# Patient Record
Sex: FEMALE | Race: WHITE | NOT HISPANIC OR LATINO | Employment: FULL TIME | URBAN - METROPOLITAN AREA
[De-identification: names, ages, dates, MRNs, and addresses within clinical notes are randomized per-mention and may not be internally consistent; named-entity substitution may affect disease eponyms.]

---

## 2023-03-16 DIAGNOSIS — J30.9 ALLERGIC RHINITIS, UNSPECIFIED: ICD-10-CM

## 2023-03-20 RX ORDER — AZELASTINE 1 MG/ML
SPRAY, METERED NASAL
Qty: 1 ML | Refills: 0 | Status: SHIPPED | OUTPATIENT
Start: 2023-03-20 | End: 2023-12-12 | Stop reason: WASHOUT

## 2023-03-20 RX ORDER — FLUTICASONE PROPIONATE 50 MCG
SPRAY, SUSPENSION (ML) NASAL
Qty: 16 ML | Refills: 0 | Status: SHIPPED | OUTPATIENT
Start: 2023-03-20 | End: 2023-12-12 | Stop reason: WASHOUT

## 2023-06-21 ENCOUNTER — TELEPHONE (OUTPATIENT)
Dept: PRIMARY CARE | Facility: CLINIC | Age: 63
End: 2023-06-21
Payer: COMMERCIAL

## 2023-06-21 ENCOUNTER — LAB (OUTPATIENT)
Dept: LAB | Facility: LAB | Age: 63
End: 2023-06-21
Payer: COMMERCIAL

## 2023-06-21 DIAGNOSIS — R39.9 URINARY TRACT INFECTION SYMPTOMS: ICD-10-CM

## 2023-06-21 DIAGNOSIS — N39.0 ACUTE UTI: Primary | ICD-10-CM

## 2023-06-21 DIAGNOSIS — R39.9 URINARY TRACT INFECTION SYMPTOMS: Primary | ICD-10-CM

## 2023-06-21 PROCEDURE — 87086 URINE CULTURE/COLONY COUNT: CPT

## 2023-06-21 PROCEDURE — 87077 CULTURE AEROBIC IDENTIFY: CPT

## 2023-06-21 PROCEDURE — 87186 SC STD MICRODIL/AGAR DIL: CPT

## 2023-06-23 RX ORDER — NITROFURANTOIN 25; 75 MG/1; MG/1
100 CAPSULE ORAL 2 TIMES DAILY
Qty: 10 CAPSULE | Refills: 0 | Status: SHIPPED | OUTPATIENT
Start: 2023-06-23 | End: 2023-06-28

## 2023-06-24 LAB — URINE CULTURE: ABNORMAL

## 2023-06-26 RX ORDER — TRIMETHOPRIM 100 MG/1
100 TABLET ORAL 2 TIMES DAILY
Qty: 10 TABLET | Refills: 0 | Status: SHIPPED | OUTPATIENT
Start: 2023-06-26 | End: 2023-07-01

## 2023-07-31 ENCOUNTER — TELEPHONE (OUTPATIENT)
Dept: PRIMARY CARE | Facility: CLINIC | Age: 63
End: 2023-07-31
Payer: COMMERCIAL

## 2023-07-31 DIAGNOSIS — G89.29 CHRONIC NECK PAIN: Primary | ICD-10-CM

## 2023-07-31 DIAGNOSIS — M54.2 CHRONIC NECK PAIN: Primary | ICD-10-CM

## 2023-07-31 PROBLEM — N80.9 ENDOMETRIOSIS: Status: ACTIVE | Noted: 2023-07-31

## 2023-07-31 PROBLEM — N95.2 ATROPHIC VAGINITIS: Status: ACTIVE | Noted: 2023-07-31

## 2023-07-31 PROBLEM — D17.9 LIPOMA: Status: ACTIVE | Noted: 2023-07-31

## 2023-10-02 ENCOUNTER — TREATMENT (OUTPATIENT)
Dept: PHYSICAL THERAPY | Facility: CLINIC | Age: 63
End: 2023-10-02
Payer: COMMERCIAL

## 2023-10-02 DIAGNOSIS — M54.2 CHRONIC NECK PAIN: Primary | ICD-10-CM

## 2023-10-02 DIAGNOSIS — G89.29 CHRONIC NECK PAIN: Primary | ICD-10-CM

## 2023-10-02 PROCEDURE — 97110 THERAPEUTIC EXERCISES: CPT | Mod: GP | Performed by: PHYSICAL THERAPIST

## 2023-10-02 PROCEDURE — 97140 MANUAL THERAPY 1/> REGIONS: CPT | Mod: GP | Performed by: PHYSICAL THERAPIST

## 2023-10-02 ASSESSMENT — PAIN - FUNCTIONAL ASSESSMENT: PAIN_FUNCTIONAL_ASSESSMENT: 0-10

## 2023-10-02 ASSESSMENT — PAIN SCALES - GENERAL: PAINLEVEL_OUTOF10: 1

## 2023-10-02 NOTE — PROGRESS NOTES
Physical Therapy  Physical Therapy Treatment    Patient Name: Luh Degroot  MRN: [id number]  Today's Date: 10/2/2023  Time Calculation  Start Time: 1655  Stop Time: 1735  Time Calculation (min): 40 min    Visit Count: 5  Auth: No  Insurance:  Employee Medical Plan      Current Problem  1. Chronic neck pain            General  Reason for Referral: neck pain  Referred By: Oh    Pain  Pain Assessment: 0-10  Pain Score: 1  Pain Location: Neck    Subjective:   Subjective   Patient reports Pt reports that her neck is catching more when doing the exercises.     Objective:   R Cervical Rotation: 60%      Treatments:   Therapeutic Exercise  Therapeutic Exercise Activity 1: Cervical Snag ext x20  Therapeutic Exercise Activity 2: Cervical snag rotation x20 each  Therapeutic Exercise Activity 3: standing ER 3x10 B 2.5#  Therapeutic Exercise Activity 4: Standing scaption 3x10 2#  Therapeutic Exercise Activity 5: Wall circles 2x10 clockwise/counter B  Therapeutic Exercise Activity 6: Prone row 2x10 B 5#    Manual Therapy  Manual Therapy Activity 1: Dry Needling 3x30 mm R UT/erector spinae x10 min      Assessment:      Dry needling was initiated today with fair tolerance, pt demonstrated a twitch response and a deep aching sensation in the R upper trap and erector spinae. Pt demonstrated improvement in her R cervical rotation afterwards and no catching sensation that she was reporting prior to the appointment. The remaining session was focused on shoulder strengthening with fair tolerance. The pt did demonstrate increased muscular fatigue with the noted exercises. The pt is still though limited in her cervical ROM and pain at this time, along with her shoulder and scapular strength/endurance.     Education:     Access Code: [email or web address]  URL: [email or web address]  Date: 10/02/2023  Prepared by: Richardson Manning    Exercises  - Seated Upper Trapezius Stretch  - 2 x daily - 7 x weekly - 2 reps - 30 sec  hold  - Seated Levator Scapulae Stretch  - 2 x daily - 7 x weekly - 2 reps - 30 sec hold  - Cervical Extension AROM with Strap  - 2 x daily - 7 x weekly - 3 sets - 10 reps  - Seated Assisted Cervical Rotation with Towel  - 2 x daily - 7 x weekly - 2 sets - 15 reps  - Supine Cervical Retraction with Towel  - 2 x daily - 7 x weekly - 3 sets - 10 reps  - Sidelying Open Book Thoracic Lumbar Rotation and Extension  - 2 x daily - 7 x weekly - 2 sets - 10 reps  - Prone Shoulder Horizontal Abduction  - 2 x daily - 7 x weekly - 2 sets - 6 reps - 6 sec hold  - Scaption with Dumbbells  - 2 x daily - 7 x weekly - 3 sets - 10 reps    Plan: Continue with neck ROM, assesses response to Dry needling.        Goals:  Encounter Problems       Encounter Problems (Active)       PT Problem       PT Goal 1       Start:  10/02/23    Expected End:  10/06/23       Short Term  1. Pt will demonstrated improvements in shoulder/scapular strength, specifically to 4+/5 in 4 weeks, in order to progress back to PLOF.    2. Pt will demonstrate improvements in cervical AROM, specifically rotation 50% in 4 weeks, in order to improve functional level.     3. Pt will demonstrate the ability to complete half a work day (4 hours) with minimal pain (2-3/10 pain) in 4 weeks, in order to return to pain free prior level.     4. Pt will demonstrate improved standing posture and proper shoulder and scapular control with overhead activities in 4 weeks, in order to decrease stress and strain with activities.    5. Pt will demonstrate Ind ability with HEP.            PT Goal 2       Start:  10/02/23    Expected End:  11/06/23       Long Term Goals  1. Pt will demonstrated improvements and symmetry in shoulder/scapular strength to 5/5 in 8 weeks, in order to return to PLOF.    2. Pt will demonstrate full cervical and shoulder ROM, specifically in 8 weeks, in order to return to prior overhead function.    3. Pt will demonstrate the ability to complete all functional  activities with no pain in 8 weeks.     4. Pt will demonstrate the ability to complete a full work day (8 hours) with no pain (0-1/10 pain) in 8 weeks, in order to return to pain free prior level.     5. decrease NDI by 6 points, in 8 weeks, in order to return to PLOF.                     Richardson Manning, PT

## 2023-10-11 PROBLEM — L82.1 OTHER SEBORRHEIC KERATOSIS: Status: ACTIVE | Noted: 2022-12-06

## 2023-10-11 PROBLEM — K62.5 RECTAL BLEEDING: Status: ACTIVE | Noted: 2023-10-11

## 2023-10-11 PROBLEM — R43.8 METALLIC TASTE: Status: ACTIVE | Noted: 2023-10-11

## 2023-10-11 PROBLEM — M79.641 PAIN OF RIGHT HAND: Status: ACTIVE | Noted: 2023-10-11

## 2023-10-11 PROBLEM — S92.911A FRACTURE OF TOE OF RIGHT FOOT: Status: ACTIVE | Noted: 2023-10-11

## 2023-10-11 PROBLEM — D48.5 NEOPLASM OF UNCERTAIN BEHAVIOR OF SKIN: Status: ACTIVE | Noted: 2022-12-06

## 2023-10-11 PROBLEM — L57.9 SKIN CHANGES DUE TO CHRONIC EXPOSURE TO NONIONIZING RADIATION: Status: ACTIVE | Noted: 2022-12-06

## 2023-10-11 PROBLEM — R05.9 COUGH: Status: ACTIVE | Noted: 2023-10-11

## 2023-10-11 PROBLEM — H52.4 BILATERAL PRESBYOPIA: Status: ACTIVE | Noted: 2023-10-11

## 2023-10-11 PROBLEM — N89.8 VAGINAL ITCHING: Status: ACTIVE | Noted: 2023-10-11

## 2023-10-11 PROBLEM — H52.203 ASTIGMATISM, BILATERAL: Status: ACTIVE | Noted: 2023-10-11

## 2023-10-11 PROBLEM — L81.4 OTHER MELANIN HYPERPIGMENTATION: Status: ACTIVE | Noted: 2022-12-06

## 2023-10-11 PROBLEM — N76.0 VAGINITIS: Status: ACTIVE | Noted: 2023-10-11

## 2023-10-11 PROBLEM — R07.89 ATYPICAL CHEST PAIN: Status: ACTIVE | Noted: 2023-10-11

## 2023-10-11 PROBLEM — K58.0 IRRITABLE BOWEL SYNDROME WITH DIARRHEA: Status: ACTIVE | Noted: 2023-10-11

## 2023-10-11 PROBLEM — R11.0 MODERATE NAUSEA: Status: ACTIVE | Noted: 2023-10-11

## 2023-10-11 PROBLEM — L57.0 ACTINIC KERATOSIS: Status: ACTIVE | Noted: 2022-12-06

## 2023-10-11 PROBLEM — J30.9 ALLERGIC RHINITIS: Status: ACTIVE | Noted: 2023-10-11

## 2023-10-11 PROBLEM — K62.5 BRIGHT RED BLOOD PER RECTUM: Status: ACTIVE | Noted: 2023-10-11

## 2023-10-11 PROBLEM — D22.60 MELANOCYTIC NEVI OF UNSPECIFIED UPPER LIMB, INCLUDING SHOULDER: Status: ACTIVE | Noted: 2022-12-06

## 2023-10-11 PROBLEM — M77.12 LATERAL EPICONDYLITIS OF LEFT ELBOW: Status: ACTIVE | Noted: 2023-10-11

## 2023-10-11 PROBLEM — D18.01 HEMANGIOMA OF SKIN AND SUBCUTANEOUS TISSUE: Status: ACTIVE | Noted: 2022-12-06

## 2023-10-11 PROBLEM — N76.2 VULVITIS: Status: ACTIVE | Noted: 2023-10-11

## 2023-10-11 PROBLEM — E55.9 VITAMIN D DEFICIENCY: Status: ACTIVE | Noted: 2023-10-11

## 2023-10-11 PROBLEM — B07.8 OTHER VIRAL WARTS: Status: ACTIVE | Noted: 2022-12-06

## 2023-10-11 PROBLEM — M75.21 BICIPITAL TENDINITIS OF RIGHT SHOULDER: Status: ACTIVE | Noted: 2023-10-11

## 2023-10-11 PROBLEM — D22.5 MELANOCYTIC NEVI OF TRUNK: Status: ACTIVE | Noted: 2022-12-06

## 2023-10-11 PROBLEM — D22.72 MELANOCYTIC NEVI OF LEFT LOWER LIMB, INCLUDING HIP: Status: ACTIVE | Noted: 2022-12-06

## 2023-10-11 RX ORDER — ACETAMINOPHEN 500 MG
50 TABLET ORAL DAILY
COMMUNITY
Start: 2020-03-24

## 2023-10-11 RX ORDER — IBUPROFEN 600 MG/1
600 TABLET ORAL 4 TIMES DAILY
COMMUNITY
Start: 2013-06-11 | End: 2023-12-12 | Stop reason: WASHOUT

## 2023-10-11 RX ORDER — CIPROFLOXACIN 500 MG/1
500 TABLET ORAL 2 TIMES DAILY
COMMUNITY
Start: 2010-12-30 | End: 2023-12-12 | Stop reason: WASHOUT

## 2023-10-11 RX ORDER — SOD SULF/POT CHLORIDE/MAG SULF 1.479 G
TABLET ORAL
COMMUNITY
End: 2023-12-12 | Stop reason: WASHOUT

## 2023-10-11 RX ORDER — DESIPRAMINE HYDROCHLORIDE 25 MG/1
TABLET ORAL
COMMUNITY
Start: 2018-06-02 | End: 2023-12-12 | Stop reason: WASHOUT

## 2023-10-11 RX ORDER — PETROLATUM,WHITE/LANOLIN
OINTMENT (GRAM) TOPICAL
COMMUNITY
Start: 2023-02-06

## 2023-10-12 ENCOUNTER — TREATMENT (OUTPATIENT)
Dept: PHYSICAL THERAPY | Facility: CLINIC | Age: 63
End: 2023-10-12
Payer: COMMERCIAL

## 2023-10-12 DIAGNOSIS — G89.29 CHRONIC NECK PAIN: Primary | ICD-10-CM

## 2023-10-12 DIAGNOSIS — M54.2 CHRONIC NECK PAIN: Primary | ICD-10-CM

## 2023-10-12 PROCEDURE — 97110 THERAPEUTIC EXERCISES: CPT | Mod: GP | Performed by: PHYSICAL THERAPIST

## 2023-10-12 PROCEDURE — 97140 MANUAL THERAPY 1/> REGIONS: CPT | Mod: GP | Performed by: PHYSICAL THERAPIST

## 2023-10-12 ASSESSMENT — PAIN SCALES - GENERAL: PAINLEVEL_OUTOF10: 6

## 2023-10-12 ASSESSMENT — PAIN - FUNCTIONAL ASSESSMENT: PAIN_FUNCTIONAL_ASSESSMENT: 0-10

## 2023-10-12 NOTE — PROGRESS NOTES
Physical Therapy  Physical Therapy Treatment    Patient Name: Luh Degroot  MRN: [id number]  Today's Date: 10/12/2023  Time Calculation  Start Time: 0750  Stop Time: 0830  Time Calculation (min): 40 min    Visit Count: 5  Auth: No  Insurance:  Employee Medical Plan  Current Problem    1. Chronic neck pain            General  Reason for Referral: neck pain  Referred By: Oh    Pain  Pain Assessment: 0-10  Pain Score: 6  Pain Location: Neck    Subjective:   Patient reports that she felt great after the needling for one day, but then her pain     HEP Compliance: Good    Objective:   R cervical rotation: 25%    Treatments:   Therapeutic Exercise  Therapeutic Exercise Activity 1: Chin tucks with hot pack x50 reps  Therapeutic Exercise Activity 2: pec stretch x2 min  Therapeutic Exercise Activity 3: wall angels 2x10    Manual Therapy  Manual Therapy Activity 1: STM to suboccipitals x5 min  Manual Therapy Activity 2: PA gliding to C2-C5 Grade III x5 min  Manual Therapy Activity 3: Dry needling 4x30 mm to R erector spinae x15 min      Assessment: Pt demonstrated significant limitations in her pain and neck ROM today. The pt demonstrated increased upper cervical tightness and muscle spasm. Soft tissue, dry needling, and mobilizations were performed today with some improvements, but not greatly improved. Pec stretching was initiated today with good tolerance. The pt is still greatly limited in her overall pain and mobility, where she continues to be a good candidate for skilled PT.        Education: Educated on decrease stretching at home. Will initiate use of heat pad before and during exercises.   Access Code: [email or web address]  URL: [email or web address]  Date: 10/12/2023  Prepared by: Richardson Manning    Exercises  - Seated Upper Trapezius Stretch  - 2 x daily - 7 x weekly - 2 reps - 30 sec hold  - Seated Levator Scapulae Stretch  - 2 x daily - 7 x weekly - 2 reps - 30 sec hold  - Cervical Extension AROM  with Strap  - 2 x daily - 7 x weekly - 3 sets - 10 reps  - Seated Assisted Cervical Rotation with Towel  - 2 x daily - 7 x weekly - 2 sets - 15 reps  - Supine Cervical Retraction with Towel  - 2 x daily - 7 x weekly - 3 sets - 10 reps  - Sidelying Open Book Thoracic Lumbar Rotation and Extension  - 2 x daily - 7 x weekly - 2 sets - 10 reps    Plan: Re-assess response to needling and overall motion and pain.        Goals:  Active       PT Problem       PT Goal 1       Start:  10/02/23    Expected End:  10/06/23       Short Term  1. Pt will demonstrated improvements in shoulder/scapular strength, specifically to 4+/5 in 4 weeks, in order to progress back to PLOF.    2. Pt will demonstrate improvements in cervical AROM, specifically rotation 50% in 4 weeks, in order to improve functional level.     3. Pt will demonstrate the ability to complete half a work day (4 hours) with minimal pain (2-3/10 pain) in 4 weeks, in order to return to pain free prior level.     4. Pt will demonstrate improved standing posture and proper shoulder and scapular control with overhead activities in 4 weeks, in order to decrease stress and strain with activities.    5. Pt will demonstrate Ind ability with HEP.            PT Goal 2       Start:  10/02/23    Expected End:  11/06/23       Long Term Goals  1. Pt will demonstrated improvements and symmetry in shoulder/scapular strength to 5/5 in 8 weeks, in order to return to PLOF.    2. Pt will demonstrate full cervical and shoulder ROM, specifically in 8 weeks, in order to return to prior overhead function.    3. Pt will demonstrate the ability to complete all functional activities with no pain in 8 weeks.     4. Pt will demonstrate the ability to complete a full work day (8 hours) with no pain (0-1/10 pain) in 8 weeks, in order to return to pain free prior level.     5. decrease NDI by 6 points, in 8 weeks, in order to return to PLOF.                  Richardson Manning, PT, SCS, CSCS

## 2023-10-17 ENCOUNTER — TREATMENT (OUTPATIENT)
Dept: PHYSICAL THERAPY | Facility: CLINIC | Age: 63
End: 2023-10-17
Payer: COMMERCIAL

## 2023-10-17 DIAGNOSIS — G89.29 CHRONIC NECK PAIN: Primary | ICD-10-CM

## 2023-10-17 DIAGNOSIS — M54.2 CHRONIC NECK PAIN: Primary | ICD-10-CM

## 2023-10-17 PROCEDURE — 97140 MANUAL THERAPY 1/> REGIONS: CPT | Mod: GP | Performed by: PHYSICAL THERAPIST

## 2023-10-17 PROCEDURE — 97110 THERAPEUTIC EXERCISES: CPT | Mod: GP | Performed by: PHYSICAL THERAPIST

## 2023-10-17 ASSESSMENT — PAIN SCALES - GENERAL: PAINLEVEL_OUTOF10: 4

## 2023-10-17 ASSESSMENT — PAIN - FUNCTIONAL ASSESSMENT: PAIN_FUNCTIONAL_ASSESSMENT: 0-10

## 2023-10-17 NOTE — PROGRESS NOTES
Physical Therapy  Physical Therapy Treatment    Patient Name: Luh Degroot  MRN: [id number]  Today's Date: 10/17/2023  Time Calculation  Start Time: 0745  Stop Time: 0830  Time Calculation (min): 45 min    Visit Count: 6  Auth: No  Insurance:  Employee Medical Plan  Current Problem    Current Problem  1. Chronic neck pain            General  Reason for Referral: neck pain  Referred By: Oh    Pain  Pain Assessment: 0-10  Pain Score: 4  Pain Location: Neck    Subjective:   Patient reports that her neck feels slightly better today.     HEP Compliance: Good.     Objective:   R cervical rotation 50%    Treatments:   Therapeutic Exercise  Therapeutic Exercise Activity 1: hot pack x3 min  Therapeutic Exercise Activity 2: Chin tucks with hot pack x50 reps  Therapeutic Exercise Activity 3: Thoracic extension 2x10  Therapeutic Exercise Activity 4: pec stretching x2 min  Therapeutic Exercise Activity 5: strug 5# 3x10  Therapeutic Exercise Activity 6: suit case carry x3 laps    Manual Therapy  Manual Therapy Activity 1: STM to the R erector spinae x15 min  Manual Therapy Activity 2: PA gliding to C2-C5 x5 min  Manual Therapy Activity 3: Lateral glide C3-C5 x5 min      Assessment: The focus of the session was ROM, Stretching, and soft tissue massage. The pt demonstrated Fair tolerance to the noted exercises today. The pt is demonstrated Fair progress in skilled rehab at this time. The pt is though still greatly limited in her R erector spinae being reflexively contracted, limiting her R cervical rotation. The pt is though still limited by her he pt is still limited in overall ROM, Flexibility, and Pain at this time. The pt continues to be a good candidate for skilled PT, in order to further improve ROM, Flexibility, and Pain.          Education: Access Code: [email or web address]  URL: [email or web address]  Date: 10/17/2023  Prepared by: Richardson Manning    Exercises  - Seated Upper Trapezius Stretch  - 2 x daily  - 7 x weekly - 2 reps - 30 sec hold  - Seated Levator Scapulae Stretch  - 2 x daily - 7 x weekly - 2 reps - 30 sec hold  - Cervical Extension AROM with Strap  - 2 x daily - 7 x weekly - 3 sets - 10 reps  - Seated Assisted Cervical Rotation with Towel  - 2 x daily - 7 x weekly - 2 sets - 15 reps  - Supine Cervical Retraction with Towel  - 2 x daily - 7 x weekly - 3 sets - 10 reps  - Sidelying Open Book Thoracic Lumbar Rotation and Extension  - 2 x daily - 7 x weekly - 2 sets - 10 reps  - Kettlebell Suitcase Carry  - 1 x daily - 7 x weekly - 3 sets    Plan: Continue with cervical ROM.        Goals:  Active       PT Problem       PT Goal 1       Start:  10/02/23    Expected End:  10/06/23       Short Term  1. Pt will demonstrated improvements in shoulder/scapular strength, specifically to 4+/5 in 4 weeks, in order to progress back to PLOF.    2. Pt will demonstrate improvements in cervical AROM, specifically rotation 50% in 4 weeks, in order to improve functional level.     3. Pt will demonstrate the ability to complete half a work day (4 hours) with minimal pain (2-3/10 pain) in 4 weeks, in order to return to pain free prior level.     4. Pt will demonstrate improved standing posture and proper shoulder and scapular control with overhead activities in 4 weeks, in order to decrease stress and strain with activities.    5. Pt will demonstrate Ind ability with HEP.            PT Goal 2       Start:  10/02/23    Expected End:  11/06/23       Long Term Goals  1. Pt will demonstrated improvements and symmetry in shoulder/scapular strength to 5/5 in 8 weeks, in order to return to PLOF.    2. Pt will demonstrate full cervical and shoulder ROM, specifically in 8 weeks, in order to return to prior overhead function.    3. Pt will demonstrate the ability to complete all functional activities with no pain in 8 weeks.     4. Pt will demonstrate the ability to complete a full work day (8 hours) with no pain (0-1/10 pain) in 8  weeks, in order to return to pain free prior level.     5. decrease NDI by 6 points, in 8 weeks, in order to return to PLOF.                  Richardson Manning, PT, SCS, CSCS

## 2023-11-03 ENCOUNTER — TREATMENT (OUTPATIENT)
Dept: PHYSICAL THERAPY | Facility: CLINIC | Age: 63
End: 2023-11-03
Payer: COMMERCIAL

## 2023-11-03 DIAGNOSIS — G89.29 CHRONIC NECK PAIN: Primary | ICD-10-CM

## 2023-11-03 DIAGNOSIS — M54.2 CHRONIC NECK PAIN: Primary | ICD-10-CM

## 2023-11-03 PROCEDURE — 97140 MANUAL THERAPY 1/> REGIONS: CPT | Mod: GP | Performed by: PHYSICAL THERAPIST

## 2023-11-03 PROCEDURE — 97110 THERAPEUTIC EXERCISES: CPT | Mod: GP | Performed by: PHYSICAL THERAPIST

## 2023-11-03 ASSESSMENT — PAIN SCALES - GENERAL: PAINLEVEL_OUTOF10: 5 - MODERATE PAIN

## 2023-11-03 ASSESSMENT — PAIN - FUNCTIONAL ASSESSMENT: PAIN_FUNCTIONAL_ASSESSMENT: 0-10

## 2023-11-03 NOTE — PROGRESS NOTES
Physical Therapy  Physical Therapy Treatment    Patient Name: Luh Degroot  MRN: [id number]  Today's Date: 11/3/2023  Time Calculation  Start Time: 1225  Stop Time: 1305  Time Calculation (min): 40 min    Visit Count: 7  Auth: No  Insurance:  Employee Medical Plan  Current Problem    Current Problem  1. Chronic neck pain            General  Reason for Referral: neck pain  Referred By: Oh    Pain  Pain Assessment: 0-10  Pain Score: 5 - Moderate pain  Pain Location: Neck    Subjective:   Patient reports that her neck feels about the same.     HEP Compliance: Fair.     Objective:   Cervical rotation R: 30%    Treatments:   Therapeutic Exercise  Therapeutic Exercise Activity 1: suit case carry x3 laps (4 kgs)  Therapeutic Exercise Activity 2: 90/90 carry 2 kg x3 laps  Therapeutic Exercise Activity 3: Pillow case slide 3x8  Therapeutic Exercise Activity 4: Push up plus 3x10    Manual Therapy  Manual Therapy Activity 1: STM to the R erector spinae x15 min  Manual Therapy Activity 2: Lateral glide C3-C5 x5 min  Manual Therapy Activity 3: PA gliding to C2-C5 x5 min      Assessment: The focus of the session was Strengthening, ROM, joint mobilizations, and soft tissue massage. The pt demonstrated Fair tolerance to the noted exercises today. The pt is demonstrated Fair progress in skilled rehab at this time.  The pt did demonstrate improvements in her symptoms after the soft tissue today. However, the pt is though still greatly limited at this time in her pain level and overall cervical ROM, especially with R rotation. The pt is still limited in overall Strength, ROM, Flexibility, and Pain at this time. The pt continues to be a good candidate for skilled PT, in order to further improve Strength, ROM, Flexibility, and Pain.          Education: Access Code: [email or web address]  URL: [email or web address]  Date: 11/03/2023  Prepared by: Richardson Manning    Exercises  - Seated Upper Trapezius Stretch  - 2 x daily  - 7 x weekly - 2 reps - 30 sec hold  - Seated Levator Scapulae Stretch  - 2 x daily - 7 x weekly - 2 reps - 30 sec hold  - Cervical Extension AROM with Strap  - 2 x daily - 7 x weekly - 3 sets - 10 reps  - Seated Assisted Cervical Rotation with Towel  - 2 x daily - 7 x weekly - 2 sets - 15 reps  - Supine Cervical Retraction with Towel  - 2 x daily - 7 x weekly - 3 sets - 10 reps  - Sidelying Open Book Thoracic Lumbar Rotation and Extension  - 2 x daily - 7 x weekly - 2 sets - 10 reps  - Kettlebell Suitcase Carry  - 1 x daily - 7 x weekly - 3 sets  - Scapular Wall Slides  - 1 x daily - 7 x weekly - 3 sets - 8 reps    Plan:    Continue with trap stretching.     Goals:  Active       PT Problem       PT Goal 1       Start:  10/02/23    Expected End:  10/06/23       Short Term  1. Pt will demonstrated improvements in shoulder/scapular strength, specifically to 4+/5 in 4 weeks, in order to progress back to PLOF.    2. Pt will demonstrate improvements in cervical AROM, specifically rotation 50% in 4 weeks, in order to improve functional level.     3. Pt will demonstrate the ability to complete half a work day (4 hours) with minimal pain (2-3/10 pain) in 4 weeks, in order to return to pain free prior level.     4. Pt will demonstrate improved standing posture and proper shoulder and scapular control with overhead activities in 4 weeks, in order to decrease stress and strain with activities.    5. Pt will demonstrate Ind ability with HEP.            PT Goal 2       Start:  10/02/23    Expected End:  11/06/23       Long Term Goals  1. Pt will demonstrated improvements and symmetry in shoulder/scapular strength to 5/5 in 8 weeks, in order to return to PLOF.    2. Pt will demonstrate full cervical and shoulder ROM, specifically in 8 weeks, in order to return to prior overhead function.    3. Pt will demonstrate the ability to complete all functional activities with no pain in 8 weeks.     4. Pt will demonstrate the ability to  complete a full work day (8 hours) with no pain (0-1/10 pain) in 8 weeks, in order to return to pain free prior level.     5. decrease NDI by 6 points, in 8 weeks, in order to return to PLOF.                  Richardson Manning, PT, SCS, CSCS

## 2023-11-08 ENCOUNTER — TREATMENT (OUTPATIENT)
Dept: PHYSICAL THERAPY | Facility: CLINIC | Age: 63
End: 2023-11-08
Payer: COMMERCIAL

## 2023-11-08 DIAGNOSIS — M54.2 CHRONIC NECK PAIN: Primary | ICD-10-CM

## 2023-11-08 DIAGNOSIS — G89.29 CHRONIC NECK PAIN: Primary | ICD-10-CM

## 2023-11-08 PROCEDURE — 97110 THERAPEUTIC EXERCISES: CPT | Mod: GP | Performed by: PHYSICAL THERAPIST

## 2023-11-08 PROCEDURE — 97140 MANUAL THERAPY 1/> REGIONS: CPT | Mod: GP | Performed by: PHYSICAL THERAPIST

## 2023-11-08 ASSESSMENT — PAIN - FUNCTIONAL ASSESSMENT: PAIN_FUNCTIONAL_ASSESSMENT: 0-10

## 2023-11-08 ASSESSMENT — PAIN SCALES - GENERAL: PAINLEVEL_OUTOF10: 0 - NO PAIN

## 2023-11-08 NOTE — PROGRESS NOTES
Physical Therapy  Physical Therapy Treatment    Patient Name: Luh Degroot  MRN: [id number]  Today's Date: 11/8/2023  Time Calculation  Start Time: 1731  Stop Time: 1813  Time Calculation (min): 42 min    Visit Count: 8  Auth: No  Insurance:  Employee Medical Plan  Current Problem    Current Problem  1. Chronic neck pain            General  Reason for Referral: neck pain  Referred By: Oh    Pain  Pain Assessment: 0-10  Pain Score: 0 - No pain  Pain Location: Neck    Subjective:   Patient reports that she got a theragun and has helped some with her symptoms.     HEP Compliance: Good.     Objective:   R cervical rotation: 50%    Treatments:   Therapeutic Exercise  Therapeutic Exercise Activity 1: cervical snags ext x20  Therapeutic Exercise Activity 2: cervical snags rotation x20 B  Therapeutic Exercise Activity 3: Pillow case slide 3x8  Therapeutic Exercise Activity 4: icepickers 3x12 GTB  Therapeutic Exercise Activity 5: Scapular punch 3x12 3#    Manual Therapy  Manual Therapy Activity 1: Lateral glide C3-C5 x5 min  Manual Therapy Activity 2: PA gliding to C2-C5 x5 min  Manual Therapy Activity 3: Theragun to R trap and mid trap x5 min    Assessment: The focus of the session was cervical ROM, joint mobilizations, and soft tissue massage. The pt demonstrated Fair tolerance to the noted exercises today. The pt is demonstrated Fair progress in skilled rehab at this time. The pt demonstrated slight improvements in her R cervical rotation today compared to the previous session. The pt did not required today to hold her neck when sitting up from supine position, which she did previously. The pt is still though limited in overall Strength, ROM, Flexibility, and Pain at this time. The pt continues to be a good candidate for skilled PT, in order to further improve Strength, ROM, Flexibility, and Pain.          Education: Access Code: [email or web address]  URL: [email or web address]  Date:  11/08/2023  Prepared by: Richardson Manning    Exercises  - Seated Upper Trapezius Stretch  - 2 x daily - 7 x weekly - 2 reps - 30 sec hold  - Seated Levator Scapulae Stretch  - 2 x daily - 7 x weekly - 2 reps - 30 sec hold  - Cervical Extension AROM with Strap  - 2 x daily - 7 x weekly - 3 sets - 10 reps  - Seated Assisted Cervical Rotation with Towel  - 2 x daily - 7 x weekly - 2 sets - 15 reps  - Supine Cervical Retraction with Towel  - 2 x daily - 7 x weekly - 3 sets - 10 reps  - Kettlebell Suitcase Carry  - 1 x daily - 7 x weekly - 3 sets  - Scapular Wall Slides  - 1 x daily - 7 x weekly - 3 sets - 8 reps  - Shoulder External Rotation and Scapular Retraction with Resistance  - 1 x daily - 7 x weekly - 3 sets - 10 reps    Plan: Re-assess motion.        Goals:  Active       PT Problem       PT Goal 1       Start:  10/02/23    Expected End:  10/06/23       Short Term  1. Pt will demonstrated improvements in shoulder/scapular strength, specifically to 4+/5 in 4 weeks, in order to progress back to PLOF.    2. Pt will demonstrate improvements in cervical AROM, specifically rotation 50% in 4 weeks, in order to improve functional level.     3. Pt will demonstrate the ability to complete half a work day (4 hours) with minimal pain (2-3/10 pain) in 4 weeks, in order to return to pain free prior level.     4. Pt will demonstrate improved standing posture and proper shoulder and scapular control with overhead activities in 4 weeks, in order to decrease stress and strain with activities.    5. Pt will demonstrate Ind ability with HEP.            PT Goal 2       Start:  10/02/23    Expected End:  11/06/23       Long Term Goals  1. Pt will demonstrated improvements and symmetry in shoulder/scapular strength to 5/5 in 8 weeks, in order to return to PLOF.    2. Pt will demonstrate full cervical and shoulder ROM, specifically in 8 weeks, in order to return to prior overhead function.    3. Pt will demonstrate the ability to complete  all functional activities with no pain in 8 weeks.     4. Pt will demonstrate the ability to complete a full work day (8 hours) with no pain (0-1/10 pain) in 8 weeks, in order to return to pain free prior level.     5. decrease NDI by 6 points, in 8 weeks, in order to return to PLOF.                  Richardson Manning, PT, SCS, CSCS

## 2023-11-14 ENCOUNTER — TREATMENT (OUTPATIENT)
Dept: PHYSICAL THERAPY | Facility: CLINIC | Age: 63
End: 2023-11-14
Payer: COMMERCIAL

## 2023-11-14 DIAGNOSIS — G89.29 CHRONIC NECK PAIN: Primary | ICD-10-CM

## 2023-11-14 DIAGNOSIS — M54.2 CHRONIC NECK PAIN: Primary | ICD-10-CM

## 2023-11-14 PROCEDURE — 97140 MANUAL THERAPY 1/> REGIONS: CPT | Mod: GP | Performed by: PHYSICAL THERAPIST

## 2023-11-14 PROCEDURE — 97110 THERAPEUTIC EXERCISES: CPT | Mod: GP | Performed by: PHYSICAL THERAPIST

## 2023-11-14 ASSESSMENT — PAIN SCALES - GENERAL: PAINLEVEL_OUTOF10: 0 - NO PAIN

## 2023-11-14 ASSESSMENT — PAIN - FUNCTIONAL ASSESSMENT: PAIN_FUNCTIONAL_ASSESSMENT: 0-10

## 2023-11-14 NOTE — PROGRESS NOTES
Physical Therapy  Physical Therapy Treatment    Patient Name: Luh Degroot  MRN: [id number]  Today's Date: 11/14/2023  Time Calculation  Start Time: 0700  Stop Time: 0740  Time Calculation (min): 40 min    Visit Count: 9  Auth: No  Insurance:  Employee Medical Plan  Current Problem    Current Problem  1. Chronic neck pain            General  Reason for Referral: neck pain  Referred By: Oh    Pain  Pain Assessment: 0-10  Pain Score: 0 - No pain  Pain Location: Neck    Subjective:   Patient reports that she is getting less catching in her neck, but it feels about the same.     HEP Compliance: Good.     Objective:   Cervical flexion/extension: 80%  Cervical R rotation: 50%    Treatments:   Therapeutic Exercise  Therapeutic Exercise Activity 1: cervical snags rotation x20 B  Therapeutic Exercise Activity 2: cervical snags ext x20  Therapeutic Exercise Activity 3: thoracic ext x20  Therapeutic Exercise Activity 4: suitcase carry x3 laps 6 kg  Therapeutic Exercise Activity 5: shrugs 3x8 6 kg  Therapeutic Exercise Activity 6: pec stretch x2 min    Manual Therapy  Manual Therapy Activity 1: Lateral glide C3-C5 x5 min  Manual Therapy Activity 2: PA gliding to C2-C5 x5 min  Manual Therapy Activity 3: Theragun to R trap and mid trap x5 min      Assessment:    The focus of the session was ROM, Stretching, joint mobilizations, and soft tissue massage. The pt demonstrated Fair tolerance to the noted exercises today. The pt is demonstrated Fair progress in skilled rehab at this time. The pt did report improvements in her overall symptoms at the end of the session, with slight improvements in her R cervical rotation. The pt is still limited in overall Strength, ROM, Flexibility, Motor control, and Pain at this time. The pt continues to be a good candidate for skilled PT, in order to further improve Strength, ROM, Flexibility, Motor control, and Pain.       Education: Access Code: [email or web address]  URL:  [email or web address]  Date: 11/14/2023  Prepared by: Richardson Manning    Exercises  - Seated Upper Trapezius Stretch  - 2 x daily - 7 x weekly - 2 reps - 30 sec hold  - Seated Levator Scapulae Stretch  - 2 x daily - 7 x weekly - 2 reps - 30 sec hold  - Cervical Extension AROM with Strap  - 2 x daily - 7 x weekly - 3 sets - 10 reps  - Seated Assisted Cervical Rotation with Towel  - 2 x daily - 7 x weekly - 2 sets - 15 reps  - Kettlebell Suitcase Carry  - 1 x daily - 7 x weekly - 3 sets  - Standing Backward Shoulder Shrug with Dumbbells  - 1 x daily - 7 x weekly - 3 sets - 8 reps  - Doorway Pec Stretch at 60 Degrees Abduction with Arm Straight  - 1 x daily - 7 x weekly - 1 sets - 1 min hold    Plan: PROGRESS REPORT       Goals:  Active       PT Problem       PT Goal 1       Start:  10/02/23    Expected End:  10/06/23       Short Term  1. Pt will demonstrated improvements in shoulder/scapular strength, specifically to 4+/5 in 4 weeks, in order to progress back to PLOF.    2. Pt will demonstrate improvements in cervical AROM, specifically rotation 50% in 4 weeks, in order to improve functional level.     3. Pt will demonstrate the ability to complete half a work day (4 hours) with minimal pain (2-3/10 pain) in 4 weeks, in order to return to pain free prior level.     4. Pt will demonstrate improved standing posture and proper shoulder and scapular control with overhead activities in 4 weeks, in order to decrease stress and strain with activities.    5. Pt will demonstrate Ind ability with HEP.            PT Goal 2       Start:  10/02/23    Expected End:  11/06/23       Long Term Goals  1. Pt will demonstrated improvements and symmetry in shoulder/scapular strength to 5/5 in 8 weeks, in order to return to PLOF.    2. Pt will demonstrate full cervical and shoulder ROM, specifically in 8 weeks, in order to return to prior overhead function.    3. Pt will demonstrate the ability to complete all functional  activities with no pain in 8 weeks.     4. Pt will demonstrate the ability to complete a full work day (8 hours) with no pain (0-1/10 pain) in 8 weeks, in order to return to pain free prior level.     5. decrease NDI by 6 points, in 8 weeks, in order to return to PLOF.                  Richardson Manning, PT, SCS, CSCS

## 2023-11-28 DIAGNOSIS — M54.2 CHRONIC NECK PAIN: Primary | ICD-10-CM

## 2023-11-28 DIAGNOSIS — G89.29 CHRONIC NECK PAIN: Primary | ICD-10-CM

## 2023-11-30 ENCOUNTER — TREATMENT (OUTPATIENT)
Dept: PHYSICAL THERAPY | Facility: CLINIC | Age: 63
End: 2023-11-30
Payer: COMMERCIAL

## 2023-11-30 DIAGNOSIS — G89.29 CHRONIC NECK PAIN: Primary | ICD-10-CM

## 2023-11-30 DIAGNOSIS — M54.2 CHRONIC NECK PAIN: Primary | ICD-10-CM

## 2023-11-30 PROCEDURE — 97110 THERAPEUTIC EXERCISES: CPT | Mod: GP | Performed by: PHYSICAL THERAPIST

## 2023-11-30 PROCEDURE — 97140 MANUAL THERAPY 1/> REGIONS: CPT | Mod: GP | Performed by: PHYSICAL THERAPIST

## 2023-11-30 ASSESSMENT — PAIN - FUNCTIONAL ASSESSMENT: PAIN_FUNCTIONAL_ASSESSMENT: 0-10

## 2023-11-30 ASSESSMENT — PAIN SCALES - GENERAL: PAINLEVEL_OUTOF10: 0 - NO PAIN

## 2023-11-30 NOTE — PROGRESS NOTES
Physical Therapy  Physical Therapy Orthopedic Progress Note    Patient Name: Luh Degroot  MRN: [id number]  Today's Date: 11/30/2023  Time Calculation  Start Time: 1130  Stop Time: 1215  Time Calculation (min): 45 min    Visit Count: 10  Auth: No  Insurance:  Employee Medical Plan  Current Problem    Current Problem  1. Chronic neck pain            General:  General  Reason for Referral: neck pain  Referred By: Oh    Pain:  Pain Assessment: 0-10  Pain Score: 0 - No pain  Pain Location: Neck    Subjective:  Subjective   Patient reports that she feels that she is slightly better.     Current Condition: Better    HEP Compliance: Good    Self Reported Function (0-100%) = 60%  - 100% being back to PLOF    Objective:    Cervical AROM  Cervical AROM  Cervical flexion: (80°): 90%  Cervical extension: (50°): 90%  Cervical roatation right: (80°): 50%  Cervical rotation left: (80°): 75%  Cervical sidebend right: (45°): 50%  Cervical sidebend left: (45°): 50%  Shoulder Strength  R shoulder flexion: (5/5): 4+  L shoulder flexion: (5/5): 4+  R shoulder extension: (5/5): 4+  R shoulder abduction: (5/5): 4+  L shoulder abduction: (5/5): 4+  R shoulder ER: (5/5): 4+  L shoulder ER: (5/5): 4+  R shoulder IR: (5/5) : 5  L shoulder IR: (5/5): 5  Shoulder AROM  Shoulder AROM  R shoulder flexion: (180°): 170  L shoulder flexion: (180°): 170  R shoulder ER: (90°): 80  L shoulder ER: (90°): 80  R shoulder IR: (70°): T4  L shoulder IR: (70°): T2  Scapular MMT  R lower trapezius: (5/5): 4+  L lower trapezius: (5/5): 4+  R middle trapezius: (5/5): 4+  L middle trapezius: (5/5): 4+  R rhomboids: (5/5): 4+  L rhomboids: (5/5): 4+      Outcome Measures: Updated 11/30/2023  Other Measures  Neck Disability Index: 12, 24%    Treatments:   Therapeutic Exercise  Therapeutic Exercise Activity 1: objective measures  Therapeutic Exercise Activity 2: cervical snag rotation  Therapeutic Exercise Activity 3: 90/90 carry x3 laps 2 kg  Therapeutic  Exercise Activity 4: cervical snag ext 3x10  Therapeutic Exercise Activity 5: suitcase carry 6 kg x3 laps  Therapeutic Exercise Activity 6: shrugs 3x6 8 kg  Therapeutic Exercise Activity 7: shoulder flexion with RTB with scapular punch 2x10    Manual Therapy  Manual Therapy Activity 1: IASTM to the R UT and mid trap x5 min  Manual Therapy Activity 2: PA gliding to C2-C5 x5 min    Assessment: The pt has demonstrated improvements in her shoulder and scapular strength since the initial examination, however, is still limited in her neck ROM and mobility, along with her symptoms. She has made some improvements in her neck symptoms and motion, albeit it has been minimal. The pt does continue to have limitations in her strength, motion, endurance, and overall functional mobility, required continued skilled care.        EDUCATION: home exercise program, plan of care, activity modifications, pain management, and injury pathology   Access Code: [email or web address]  URL: [email or web address]  Date: 11/30/2023  Prepared by: Richardson Manning    Exercises  - Cervical Extension AROM with Strap  - 2 x daily - 7 x weekly - 3 sets - 10 reps  - Seated Assisted Cervical Rotation with Towel  - 2 x daily - 7 x weekly - 2 sets - 15 reps  - Kettlebell Bottom Up Carry  - 1 x daily - 7 x weekly - 3 reps  - Kettlebell Suitcase Carry  - 1 x daily - 7 x weekly - 3 sets  - Standing Backward Shoulder Shrug with Dumbbells  - 1 x daily - 7 x weekly - 3 sets - 8 reps  - Doorway Pec Stretch at 60 Degrees Abduction with Arm Straight  - 1 x daily - 7 x weekly - 1 sets - 1 min hold    Plan of Care: Updated 11/30/2023  Treatment/Interventions: Aquatic therapy, Blood flow restriction therapy, Dry needling, Education/ Instruction, Electrical stimulation, Manual therapy, Mechanical traction, Neuromuscular re-education, Self care/ home management, Therapeutic activities, Therapeutic exercises, Vasopneumatic device  PT Plan: Skilled PT  PT Frequency:  1 time per week  Duration: 8 weeks additional  Onset Date: 11/30/23  Rehab Potential: Fair  Plan of Care Agreement: Patient    Goals: Updated 11/30/2023  Active       PT Problem       PT Goal 1 (Met)       Start:  10/02/23    Expected End:  10/06/23    Resolved:  11/30/23    Short Term  1. Pt will demonstrated improvements in shoulder/scapular strength, specifically to 4+/5 in 4 weeks, in order to progress back to PLOF. -MET    2. Pt will demonstrate improvements in cervical AROM, specifically rotation 50% in 4 weeks, in order to improve functional level.  -MET    3. Pt will demonstrate the ability to complete half a work day (4 hours) with minimal pain (2-3/10 pain) in 4 weeks, in order to return to pain free prior level. -MET    4. Pt will demonstrate improved standing posture and proper shoulder and scapular control with overhead activities in 4 weeks, in order to decrease stress and strain with activities. -MET    5. Pt will demonstrate Ind ability with HEP. -MET           PT Goal 2 (Progressing)       Start:  10/02/23    Expected End:  01/05/24       Long Term Goals  1. Pt will demonstrated improvements and symmetry in shoulder/scapular strength to 5/5 in 8 weeks, in order to return to PLOF. -PROGRESSING    2. Pt will demonstrate full cervical and shoulder ROM, specifically in 8 weeks, in order to return to prior overhead function. -PROGRESSING    3. Pt will demonstrate the ability to complete all functional activities with no pain in 8 weeks. -PROGRESSING    4. Pt will demonstrate the ability to complete a full work day (8 hours) with no pain (0-1/10 pain) in 8 weeks, in order to return to pain free prior level. -PROGRESSING    5. decrease NDI by 6 points, in 8 weeks, in order to return to PLOF. -PROGRESSING                       Richardson Manning, PT, SCS, CSCS

## 2023-12-05 ENCOUNTER — OFFICE VISIT (OUTPATIENT)
Dept: OPHTHALMOLOGY | Facility: CLINIC | Age: 63
End: 2023-12-05
Payer: COMMERCIAL

## 2023-12-05 DIAGNOSIS — H52.4 BILATERAL PRESBYOPIA: ICD-10-CM

## 2023-12-05 DIAGNOSIS — H52.223 REGULAR ASTIGMATISM OF BOTH EYES: Primary | ICD-10-CM

## 2023-12-05 PROCEDURE — 92014 COMPRE OPH EXAM EST PT 1/>: CPT | Performed by: OPTOMETRIST

## 2023-12-05 PROCEDURE — 92015 DETERMINE REFRACTIVE STATE: CPT | Performed by: OPTOMETRIST

## 2023-12-05 ASSESSMENT — ENCOUNTER SYMPTOMS
EYES NEGATIVE: 0
NEUROLOGICAL NEGATIVE: 0
ALLERGIC/IMMUNOLOGIC NEGATIVE: 0
CARDIOVASCULAR NEGATIVE: 0
CONSTITUTIONAL NEGATIVE: 0
MUSCULOSKELETAL NEGATIVE: 0
RESPIRATORY NEGATIVE: 0
HEMATOLOGIC/LYMPHATIC NEGATIVE: 0
PSYCHIATRIC NEGATIVE: 0
GASTROINTESTINAL NEGATIVE: 0
ENDOCRINE NEGATIVE: 0

## 2023-12-05 ASSESSMENT — TONOMETRY
OD_IOP_MMHG: 13
IOP_METHOD: GOLDMANN APPLANATION
OS_IOP_MMHG: 13

## 2023-12-05 ASSESSMENT — CONF VISUAL FIELD
OD_INFERIOR_TEMPORAL_RESTRICTION: 0
OS_NORMAL: 1
OD_SUPERIOR_TEMPORAL_RESTRICTION: 0
OS_SUPERIOR_NASAL_RESTRICTION: 0
OS_SUPERIOR_TEMPORAL_RESTRICTION: 0
OD_SUPERIOR_NASAL_RESTRICTION: 0
OD_INFERIOR_NASAL_RESTRICTION: 0
OD_NORMAL: 1
OS_INFERIOR_TEMPORAL_RESTRICTION: 0
OS_INFERIOR_NASAL_RESTRICTION: 0

## 2023-12-05 ASSESSMENT — REFRACTION_MANIFEST
OD_AXIS: 100
OS_SPHERE: -0.50
OS_CYLINDER: -1.50
OS_AXIS: 090
OD_ADD: +2.50
OD_CYLINDER: -2.00
OS_ADD: +2.50
OD_SPHERE: +0.50

## 2023-12-05 ASSESSMENT — VISUAL ACUITY
OD_SC+: -1
OS_PH_CC: 20/20
OD_PH_CC+: -1
METHOD: SNELLEN - LINEAR
OD_SC: 20/40
OS_SC: 20/40
OD_PH_CC: 20/20

## 2023-12-05 ASSESSMENT — SLIT LAMP EXAM - LIDS
COMMENTS: NORMAL
COMMENTS: NORMAL

## 2023-12-05 ASSESSMENT — CUP TO DISC RATIO
OS_RATIO: 0.5
OD_RATIO: 0.5

## 2023-12-05 ASSESSMENT — EXTERNAL EXAM - LEFT EYE: OS_EXAM: NORMAL

## 2023-12-05 ASSESSMENT — EXTERNAL EXAM - RIGHT EYE: OD_EXAM: NORMAL

## 2023-12-05 NOTE — PROGRESS NOTES
A spectacle prescription was given at the patient`s request.  Can get distance glasses as well.      DED use AT`s.  If needs to use over QID then RTC for dry eye testing.      PVD OU.      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  The patient was asked to return to our clinic or seek out eye care ASAP if new flashes of light or floaters are noted.       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 The patient was asked to return to our clinic in one year or sooner if ocular or vision changes occur.

## 2023-12-06 ENCOUNTER — TREATMENT (OUTPATIENT)
Dept: PHYSICAL THERAPY | Facility: CLINIC | Age: 63
End: 2023-12-06
Payer: COMMERCIAL

## 2023-12-06 DIAGNOSIS — G89.29 CHRONIC NECK PAIN: Primary | ICD-10-CM

## 2023-12-06 DIAGNOSIS — M54.2 CHRONIC NECK PAIN: Primary | ICD-10-CM

## 2023-12-06 PROCEDURE — 97110 THERAPEUTIC EXERCISES: CPT | Mod: GP | Performed by: PHYSICAL THERAPIST

## 2023-12-06 PROCEDURE — 97140 MANUAL THERAPY 1/> REGIONS: CPT | Mod: GP | Performed by: PHYSICAL THERAPIST

## 2023-12-06 ASSESSMENT — PAIN SCALES - GENERAL: PAINLEVEL_OUTOF10: 2

## 2023-12-06 ASSESSMENT — PAIN - FUNCTIONAL ASSESSMENT: PAIN_FUNCTIONAL_ASSESSMENT: 0-10

## 2023-12-06 NOTE — PROGRESS NOTES
Physical Therapy  Physical Therapy Treatment    Patient Name: Luh Degroot  MRN: [id number]  Today's Date: 12/6/2023  Time Calculation  Start Time: 1515  Stop Time: 1555  Time Calculation (min): 40 min    Visit Count: 11  Auth: No  Insurance:  Employee Medical Plan    Current Problem  1. Chronic neck pain            General  Reason for Referral: neck pain  Referred By: Oh    Pain  Pain Assessment: 0-10  Pain Score: 2  Pain Location: Neck    Subjective:   Patient reports that her neck is more sore today.     HEP Compliance: Good.     Objective:   R cervical rotation: 50%    Treatments:   Therapeutic Exercise  Therapeutic Exercise Activity 1: shoulder flexion with RTB with scapular punch 2x10  Therapeutic Exercise Activity 2: scapular punch 3x12 7#  Therapeutic Exercise Activity 3: chin tucks on defleated ball 3x10  Therapeutic Exercise Activity 4: rotation on defleated ball 2x10  Therapeutic Exercise Activity 5: suitcase carry 8 kg x3 lap Bs    Manual Therapy  Manual Therapy Activity 1: STM to R erector spinae x5 min  Manual Therapy Activity 2: lateral gliding of C3-C7 x5 min  Manual Therapy Activity 3: IASTM to R UT and mid trap x5 min      Assessment: The pt continues to demonstrate limitations in her overall R cervical rotation at this time. Even after manual techniques and self mobilization, the pt still only demonstrates 50% cervical rotation with pain. The pt is still demonstrating tightness in her R UT trap at this time. The pt does have an appointment schedule with a spine specialist to assess her neck symptoms. The pt does though still have motion and strength deficits requiring PT in the meantime.          Education: Access Code: [email or web address]  URL: [email or web address]  Date: 12/06/2023  Prepared by: Richardson Manning    Exercises  - Cervical Extension AROM with Strap  - 2 x daily - 7 x weekly - 3 sets - 10 reps  - Seated Assisted Cervical Rotation with Towel  - 2 x daily - 7 x  weekly - 2 sets - 15 reps  - Kettlebell Bottom Up Carry  - 1 x daily - 7 x weekly - 3 reps  - Kettlebell Suitcase Carry  - 1 x daily - 7 x weekly - 3 sets  - Standing Backward Shoulder Shrug with Dumbbells  - 1 x daily - 7 x weekly - 3 sets - 8 reps  - Doorway Pec Stretch at 60 Degrees Abduction with Arm Straight  - 1 x daily - 7 x weekly - 1 sets - 1 min hold  - Supine Chin Tucks on Flat Ball  - 1 x daily - 7 x weekly - 2 sets - 10 reps  - Supine Cervical Rotation AROM on Flat Ball  - 1 x daily - 7 x weekly - 2 sets - 10 reps    Plan: Re-assess her symptoms.        Goals:  Active       PT Problem       PT Goal 1 (Met)       Start:  10/02/23    Expected End:  10/06/23    Resolved:  11/30/23    Short Term  1. Pt will demonstrated improvements in shoulder/scapular strength, specifically to 4+/5 in 4 weeks, in order to progress back to PLOF. -MET    2. Pt will demonstrate improvements in cervical AROM, specifically rotation 50% in 4 weeks, in order to improve functional level.  -MET    3. Pt will demonstrate the ability to complete half a work day (4 hours) with minimal pain (2-3/10 pain) in 4 weeks, in order to return to pain free prior level. -MET    4. Pt will demonstrate improved standing posture and proper shoulder and scapular control with overhead activities in 4 weeks, in order to decrease stress and strain with activities. -MET    5. Pt will demonstrate Ind ability with HEP. -MET           PT Goal 2 (Progressing)       Start:  10/02/23    Expected End:  01/05/24       Long Term Goals  1. Pt will demonstrated improvements and symmetry in shoulder/scapular strength to 5/5 in 8 weeks, in order to return to PLOF. -PROGRESSING    2. Pt will demonstrate full cervical and shoulder ROM, specifically in 8 weeks, in order to return to prior overhead function. -PROGRESSING    3. Pt will demonstrate the ability to complete all functional activities with no pain in 8 weeks. -PROGRESSING    4. Pt will demonstrate the  ability to complete a full work day (8 hours) with no pain (0-1/10 pain) in 8 weeks, in order to return to pain free prior level. -PROGRESSING    5. decrease NDI by 6 points, in 8 weeks, in order to return to PLOF. -PROGRESSING                 Richardson Manning, PT, SCS, CSCS

## 2023-12-12 ENCOUNTER — OFFICE VISIT (OUTPATIENT)
Dept: DERMATOLOGY | Facility: CLINIC | Age: 63
End: 2023-12-12
Payer: COMMERCIAL

## 2023-12-12 DIAGNOSIS — L82.1 SEBORRHEIC KERATOSIS: ICD-10-CM

## 2023-12-12 DIAGNOSIS — L57.0 ACTINIC KERATOSIS: Primary | ICD-10-CM

## 2023-12-12 DIAGNOSIS — L81.4 LENTIGO: ICD-10-CM

## 2023-12-12 DIAGNOSIS — L57.8 ACTINIC SKIN DAMAGE: ICD-10-CM

## 2023-12-12 DIAGNOSIS — Z12.83 SCREENING EXAM FOR SKIN CANCER: ICD-10-CM

## 2023-12-12 DIAGNOSIS — D22.9 MULTIPLE BENIGN NEVI: ICD-10-CM

## 2023-12-12 PROBLEM — D22.72 MELANOCYTIC NEVI OF LEFT LOWER LIMB, INCLUDING HIP: Status: RESOLVED | Noted: 2022-12-06 | Resolved: 2023-12-12

## 2023-12-12 PROBLEM — Z80.8 FAMILY HISTORY OF MELANOMA: Status: ACTIVE | Noted: 2023-12-12

## 2023-12-12 PROBLEM — D22.60 MELANOCYTIC NEVI OF UNSPECIFIED UPPER LIMB, INCLUDING SHOULDER: Status: RESOLVED | Noted: 2022-12-06 | Resolved: 2023-12-12

## 2023-12-12 PROBLEM — D18.01 HEMANGIOMA OF SKIN AND SUBCUTANEOUS TISSUE: Status: RESOLVED | Noted: 2022-12-06 | Resolved: 2023-12-12

## 2023-12-12 PROBLEM — D17.9 LIPOMA: Status: RESOLVED | Noted: 2023-07-31 | Resolved: 2023-12-12

## 2023-12-12 PROBLEM — D48.5 NEOPLASM OF UNCERTAIN BEHAVIOR OF SKIN: Status: RESOLVED | Noted: 2022-12-06 | Resolved: 2023-12-12

## 2023-12-12 PROBLEM — L57.9 SKIN CHANGES DUE TO CHRONIC EXPOSURE TO NONIONIZING RADIATION: Status: RESOLVED | Noted: 2022-12-06 | Resolved: 2023-12-12

## 2023-12-12 PROCEDURE — 99213 OFFICE O/P EST LOW 20 MIN: CPT | Performed by: DERMATOLOGY

## 2023-12-12 PROCEDURE — 17000 DESTRUCT PREMALG LESION: CPT | Performed by: DERMATOLOGY

## 2023-12-12 PROCEDURE — 1036F TOBACCO NON-USER: CPT | Performed by: DERMATOLOGY

## 2023-12-12 ASSESSMENT — DERMATOLOGY QUALITY OF LIFE (QOL) ASSESSMENT
RATE HOW BOTHERED YOU ARE BY SYMPTOMS OF YOUR SKIN PROBLEM (EG, ITCHING, STINGING BURNING, HURTING OR SKIN IRRITATION): 0 - NEVER BOTHERED
DATE THE QUALITY-OF-LIFE ASSESSMENT WAS COMPLETED: 66820
RATE HOW EMOTIONALLY BOTHERED YOU ARE BY YOUR SKIN PROBLEM (FOR EXAMPLE, WORRY, EMBARRASSMENT, FRUSTRATION): 0 - NEVER BOTHERED
RATE HOW BOTHERED YOU ARE BY EFFECTS OF YOUR SKIN PROBLEMS ON YOUR ACTIVITIES (EG, GOING OUT, ACCOMPLISHING WHAT YOU WANT, WORK ACTIVITIES OR YOUR RELATIONSHIPS WITH OTHERS): 0 - NEVER BOTHERED

## 2023-12-12 ASSESSMENT — DERMATOLOGY PATIENT ASSESSMENT
HAVE YOU HAD OR DO YOU HAVE A STAPH INFECTION: NO
DO YOU USE A TANNING BED: NO
ARE YOU TRYING TO GET PREGNANT: NO
ARE YOU ON BIRTH CONTROL: NO
HAVE YOU HAD OR DO YOU HAVE VASCULAR DISEASE: NO
DO YOU HAVE IRREGULAR MENSTRUAL CYCLES: NO
DO YOU USE SUNSCREEN: OCCASIONALLY
ARE YOU AN ORGAN TRANSPLANT RECIPIENT: NO

## 2023-12-12 ASSESSMENT — PATIENT GLOBAL ASSESSMENT (PGA): PATIENT GLOBAL ASSESSMENT: PATIENT GLOBAL ASSESSMENT:  1 - CLEAR

## 2023-12-12 ASSESSMENT — ITCH NUMERIC RATING SCALE: HOW SEVERE IS YOUR ITCHING?: 0

## 2023-12-12 NOTE — PROGRESS NOTES
Subjective     amanda Degroot is a 63 y.o. female who presents for the following: Skin Check.     Review of Systems:  No other skin or systemic complaints other than what is documented elsewhere in the note.    The following portions of the chart were reviewed this encounter and updated as appropriate:  Tobacco  Allergies  Meds  Problems  Med Hx  Surg Hx         Skin Cancer History  No skin cancer on file.      Specialty Problems          Dermatology Problems    Actinic keratosis    Melanocytic nevi of trunk    Other viral warts    Family history of melanoma     Melanoma: Sister.              Objective   Well appearing patient in no apparent distress; mood and affect are within normal limits.    A full examination was performed including scalp, head, eyes, ears, nose, lips, neck, chest, axillae, abdomen, back, buttocks, bilateral upper extremities, bilateral lower extremities, hands, feet, fingers, toes, fingernails, and toenails. All findings within normal limits unless otherwise noted below.    Assessment/Plan   1. Actinic keratosis  Left Malar Cheek  Erythematous scaly macule(s)    - appeared shortly after last visit  - she has family gathering this weekend  - discussed option of 5-fluorouracil cream but prefers to just treat today in office    - other Aks treated at last visit on lips and hands are completely resolved    -Discussed nature of diagnosis and treatment options.   -Patient wishes to proceed with Cryotherapy today  -Possible side effects of liquid nitrogen treatment reviewed including formation of blisters, crusting, tenderness, scar, and discoloration which may be permanent.  -Patient advised to return the office for re-evaluation if the treated lesion(s) do not resolve within 4-6 weeks. Patient verbalizes understanding.    Destr of lesion - Left Malar Cheek  Complexity: simple    Destruction method: cryotherapy    Informed consent: discussed and consent obtained    Lesion destroyed using liquid  nitrogen: Yes    Outcome: patient tolerated procedure well with no complications    Post-procedure details: wound care instructions given      2. Multiple benign nevi  Brown and tan macules and papules with reassuring findings on dermoscopy    -These lesions have benign, reassuring patterns on dermoscopy  -Recommend continued self observation, and to contact the office if any changes in nevi are noticed    3. Lentigo  Tan macules    -Benign appearing on exam  -Reassurance, recommend observation    4. Seborrheic keratosis  Stuck on, waxy macule(s)/papule(s)/plaque(s) with comedo-like openings and milia like cysts    -Discussed the nature of the diagnosis  -Reassurance, recommend continued observation    5. Actinic skin damage  Background of photodamage with hyper- and hypo-pigmented macules on the skin    6. Screening exam for skin cancer    Full body skin exam  -No lesions concerning for malignancy on the remainder the skin exam today   - The ugly duckling sign was discussed. Monitor for any skin lesions that are different in color, shape, or size than others on body  -Sun protection was discussed. Recommend SPF 30+, hats with brims, sun protective clothing, and avoiding sun exposure between 10 AM and 2 PM whenever possible  -Recommend regular skin exams or sooner if new or changing lesions       Related Procedures  Follow Up In Dermatology - Established Patient        Follow up 1 year Full Skin Exam

## 2023-12-13 ENCOUNTER — TREATMENT (OUTPATIENT)
Dept: PHYSICAL THERAPY | Facility: CLINIC | Age: 63
End: 2023-12-13
Payer: COMMERCIAL

## 2023-12-13 DIAGNOSIS — G89.29 CHRONIC NECK PAIN: Primary | ICD-10-CM

## 2023-12-13 DIAGNOSIS — M54.2 CHRONIC NECK PAIN: Primary | ICD-10-CM

## 2023-12-13 PROCEDURE — 97140 MANUAL THERAPY 1/> REGIONS: CPT | Mod: GP | Performed by: PHYSICAL THERAPIST

## 2023-12-13 PROCEDURE — 97110 THERAPEUTIC EXERCISES: CPT | Mod: GP | Performed by: PHYSICAL THERAPIST

## 2023-12-13 ASSESSMENT — PAIN - FUNCTIONAL ASSESSMENT: PAIN_FUNCTIONAL_ASSESSMENT: 0-10

## 2023-12-13 ASSESSMENT — PAIN SCALES - GENERAL: PAINLEVEL_OUTOF10: 2

## 2023-12-13 NOTE — PROGRESS NOTES
Physical Therapy  Physical Therapy Treatment    Patient Name: Luh Degroot  MRN: [id number]  Today's Date: 12/13/2023  Time Calculation  Start Time: 1520  Stop Time: 1600  Time Calculation (min): 40 min    Visit Count: 12  Auth: No  Insurance:  Employee Medical Plan    Current Problem  1. Chronic neck pain            General  Reason for Referral: neck pain  Referred By: Oh    Pain  Pain Assessment: 0-10  Pain Score: 2  Pain Location: Neck    Subjective:   Patient reports that her neck pain has been worse since the previous session. Taking NSAID's once per day compared to she wasn't previously.     HEP Compliance: Good.     Objective:   R cervical rotation: 25%    Treatments:   Therapeutic Exercise  Therapeutic Exercise Activity 1: chin tucks on defleated ball 3x10  Therapeutic Exercise Activity 2: rotation on defleated ball 2x10  Therapeutic Exercise Activity 3: cervical extension snag x20  Therapeutic Exercise Activity 4: cervical snag rotation x10 B  Therapeutic Exercise Activity 5: lateral side bend stretch x1 min  Therapeutic Exercise Activity 6: scapular punch 3x12 9#    Manual Therapy  Manual Therapy Activity 1: lateral gliding of C3-C7 x5 min  Manual Therapy Activity 2: IASTM to R UT and mid trap x10 min      Assessment: The focus of the session was Strengthening, ROM, Stretching, joint mobilizations, and soft tissue massage. The pt demonstrated Good tolerance to the noted exercises today. The pt is demonstrated Poor progress in skilled rehab at this time, where the pt is still demonstrating significant limitations in R cervical rotation. The pt demonstrated decreased rotation today compared to the previous session. The pt is still limited in overall Strength, ROM, Flexibility, Motor control, and Pain at this time. The pt continues to be a good candidate for skilled PT, in order to further improve Strength, ROM, Flexibility, Motor control, and Pain.        Education: Access Code: [email or web address]  URL:  [email or web address]  Date: 12/13/2023  Prepared by: Richardson Manning    Exercises  - Seated Gentle Upper Trapezius Stretch  - 1 x daily - 7 x weekly - 1 min hold  - Supine Chin Tucks on Flat Ball  - 1 x daily - 7 x weekly - 2 sets - 10 reps  - Supine Cervical Rotation AROM on Flat Ball  - 1 x daily - 7 x weekly - 2 sets - 10 reps  - Cervical Extension AROM with Strap  - 2 x daily - 7 x weekly - 3 sets - 10 reps  - Seated Assisted Cervical Rotation with Towel  - 2 x daily - 7 x weekly - 2 sets - 15 reps  - Doorway Pec Stretch at 60 Degrees Abduction with Arm Straight  - 1 x daily - 7 x weekly - 1 sets - 1 min hold  - Kettlebell Suitcase Carry  - 1 x daily - 7 x weekly - 3 sets  - Kettlebell Bottom Up Carry  - 1 x daily - 7 x weekly - 3 reps    Plan: Continue with motion training.        Goals:  Active       PT Problem       PT Goal 1 (Met)       Start:  10/02/23    Expected End:  10/06/23    Resolved:  11/30/23    Short Term  1. Pt will demonstrated improvements in shoulder/scapular strength, specifically to 4+/5 in 4 weeks, in order to progress back to PLOF. -MET    2. Pt will demonstrate improvements in cervical AROM, specifically rotation 50% in 4 weeks, in order to improve functional level.  -MET    3. Pt will demonstrate the ability to complete half a work day (4 hours) with minimal pain (2-3/10 pain) in 4 weeks, in order to return to pain free prior level. -MET    4. Pt will demonstrate improved standing posture and proper shoulder and scapular control with overhead activities in 4 weeks, in order to decrease stress and strain with activities. -MET    5. Pt will demonstrate Ind ability with HEP. -MET           PT Goal 2 (Progressing)       Start:  10/02/23    Expected End:  01/05/24       Long Term Goals  1. Pt will demonstrated improvements and symmetry in shoulder/scapular strength to 5/5 in 8 weeks, in order to return to PLOF. -PROGRESSING    2. Pt will demonstrate full cervical and  shoulder ROM, specifically in 8 weeks, in order to return to prior overhead function. -PROGRESSING    3. Pt will demonstrate the ability to complete all functional activities with no pain in 8 weeks. -PROGRESSING    4. Pt will demonstrate the ability to complete a full work day (8 hours) with no pain (0-1/10 pain) in 8 weeks, in order to return to pain free prior level. -PROGRESSING    5. decrease NDI by 6 points, in 8 weeks, in order to return to PLOF. -PROGRESSING                 Richardson Manning, PT, SCS, CSCS

## 2023-12-19 ENCOUNTER — TREATMENT (OUTPATIENT)
Dept: PHYSICAL THERAPY | Facility: CLINIC | Age: 63
End: 2023-12-19
Payer: COMMERCIAL

## 2023-12-19 DIAGNOSIS — G89.29 CHRONIC NECK PAIN: Primary | ICD-10-CM

## 2023-12-19 DIAGNOSIS — M54.2 CHRONIC NECK PAIN: Primary | ICD-10-CM

## 2023-12-19 PROCEDURE — 97110 THERAPEUTIC EXERCISES: CPT | Mod: GP | Performed by: PHYSICAL THERAPIST

## 2023-12-19 PROCEDURE — 97140 MANUAL THERAPY 1/> REGIONS: CPT | Mod: GP | Performed by: PHYSICAL THERAPIST

## 2023-12-19 ASSESSMENT — PAIN SCALES - GENERAL: PAINLEVEL_OUTOF10: 0 - NO PAIN

## 2023-12-19 ASSESSMENT — PAIN - FUNCTIONAL ASSESSMENT: PAIN_FUNCTIONAL_ASSESSMENT: 0-10

## 2023-12-19 NOTE — PROGRESS NOTES
Physical Therapy  Physical Therapy Treatment    Patient Name: Luh Degroot  MRN: [id number]  Today's Date: 12/19/2023  Time Calculation  Start Time: 0217  Stop Time: 0257  Time Calculation (min): 40 min    Visit Count: 13  Auth: No  Insurance:  Employee Medical Plan    Current Problem  1. Chronic neck pain            General  Reason for Referral: neck pain  Referred By: Oh    Pain  Pain Assessment: 0-10  Pain Score: 0 - No pain  Pain Location: Neck    Subjective:   Patient reports that her neck feels slightly better    HEP Compliance: Good.     Objective:   R cervical Rotation 50%      Treatments:   Therapeutic Exercise  Therapeutic Exercise Activity 1: chin tucks on defleated ball 3x10  Therapeutic Exercise Activity 2: rotation on defleated ball 2x10  Therapeutic Exercise Activity 3: lateral side bend stretch x1 min  Therapeutic Exercise Activity 4: wall aguilar 2x10  Therapeutic Exercise Activity 5: shoulder flexion with RTB with scapular punch 2x10  Therapeutic Exercise Activity 6: wall circles 2x10 clockwise/counter  Therapeutic Exercise Activity 7: cane IR lift 3x10    Manual Therapy  Manual Therapy Activity 1: lateral gliding of C3-C7 x5 min  Manual Therapy Activity 2: IASTM to R UT and mid trap x10 min      Assessment: The focus of the session was Strengthening, ROM, joint mobilizations, and soft tissue massage. The pt demonstrated Fair tolerance to the noted exercises today. The pt is demonstrated Fair progress in skilled rehab at this time. The pt demonstrated improvements in her symptoms today, but still demonstrating limitations in her R cervical rotation. The pt is still limited in overall Strength, ROM, Flexibility, and Pain at this time. The pt continues to be a good candidate for skilled PT, in order to further improve Strength, ROM, Flexibility, and Pain.          Education: Continue with same HEP.     Plan: Re-assess after seeing orthopedist.        Goals:  Active       PT Problem       PT  Goal 1 (Met)       Start:  10/02/23    Expected End:  10/06/23    Resolved:  11/30/23    Short Term  1. Pt will demonstrated improvements in shoulder/scapular strength, specifically to 4+/5 in 4 weeks, in order to progress back to PLOF. -MET    2. Pt will demonstrate improvements in cervical AROM, specifically rotation 50% in 4 weeks, in order to improve functional level.  -MET    3. Pt will demonstrate the ability to complete half a work day (4 hours) with minimal pain (2-3/10 pain) in 4 weeks, in order to return to pain free prior level. -MET    4. Pt will demonstrate improved standing posture and proper shoulder and scapular control with overhead activities in 4 weeks, in order to decrease stress and strain with activities. -MET    5. Pt will demonstrate Ind ability with HEP. -MET           PT Goal 2 (Progressing)       Start:  10/02/23    Expected End:  01/05/24       Long Term Goals  1. Pt will demonstrated improvements and symmetry in shoulder/scapular strength to 5/5 in 8 weeks, in order to return to PLOF. -PROGRESSING    2. Pt will demonstrate full cervical and shoulder ROM, specifically in 8 weeks, in order to return to prior overhead function. -PROGRESSING    3. Pt will demonstrate the ability to complete all functional activities with no pain in 8 weeks. -PROGRESSING    4. Pt will demonstrate the ability to complete a full work day (8 hours) with no pain (0-1/10 pain) in 8 weeks, in order to return to pain free prior level. -PROGRESSING    5. decrease NDI by 6 points, in 8 weeks, in order to return to PLOF. -PROGRESSING                 Richardson Manning, PT, SCS, CSCS

## 2023-12-20 ENCOUNTER — APPOINTMENT (OUTPATIENT)
Dept: ORTHOPEDIC SURGERY | Facility: CLINIC | Age: 63
End: 2023-12-20
Payer: COMMERCIAL

## 2023-12-27 ENCOUNTER — APPOINTMENT (OUTPATIENT)
Dept: ORTHOPEDIC SURGERY | Facility: CLINIC | Age: 63
End: 2023-12-27
Payer: COMMERCIAL

## 2023-12-27 ENCOUNTER — APPOINTMENT (OUTPATIENT)
Dept: PHYSICAL THERAPY | Facility: CLINIC | Age: 63
End: 2023-12-27
Payer: COMMERCIAL

## 2024-01-03 ENCOUNTER — OFFICE VISIT (OUTPATIENT)
Dept: ORTHOPEDIC SURGERY | Facility: CLINIC | Age: 64
End: 2024-01-03
Payer: COMMERCIAL

## 2024-01-03 ENCOUNTER — TREATMENT (OUTPATIENT)
Dept: PHYSICAL THERAPY | Facility: CLINIC | Age: 64
End: 2024-01-03
Payer: COMMERCIAL

## 2024-01-03 ENCOUNTER — ANCILLARY PROCEDURE (OUTPATIENT)
Dept: RADIOLOGY | Facility: CLINIC | Age: 64
End: 2024-01-03
Payer: COMMERCIAL

## 2024-01-03 VITALS — WEIGHT: 140 LBS | HEIGHT: 67 IN | BODY MASS INDEX: 21.97 KG/M2

## 2024-01-03 DIAGNOSIS — M54.2 CHRONIC NECK PAIN: ICD-10-CM

## 2024-01-03 DIAGNOSIS — G89.29 CHRONIC NECK PAIN: Primary | ICD-10-CM

## 2024-01-03 DIAGNOSIS — M54.2 CHRONIC NECK PAIN: Primary | ICD-10-CM

## 2024-01-03 DIAGNOSIS — G89.29 CHRONIC NECK PAIN: ICD-10-CM

## 2024-01-03 PROCEDURE — 99203 OFFICE O/P NEW LOW 30 MIN: CPT | Performed by: PHYSICIAN ASSISTANT

## 2024-01-03 PROCEDURE — 99213 OFFICE O/P EST LOW 20 MIN: CPT | Performed by: PHYSICIAN ASSISTANT

## 2024-01-03 PROCEDURE — 97110 THERAPEUTIC EXERCISES: CPT | Mod: GP | Performed by: PHYSICAL THERAPIST

## 2024-01-03 PROCEDURE — 72050 X-RAY EXAM NECK SPINE 4/5VWS: CPT

## 2024-01-03 PROCEDURE — 97012 MECHANICAL TRACTION THERAPY: CPT | Mod: GP | Performed by: PHYSICAL THERAPIST

## 2024-01-03 PROCEDURE — 72050 X-RAY EXAM NECK SPINE 4/5VWS: CPT | Performed by: RADIOLOGY

## 2024-01-03 PROCEDURE — 1036F TOBACCO NON-USER: CPT | Performed by: PHYSICIAN ASSISTANT

## 2024-01-03 PROCEDURE — 97140 MANUAL THERAPY 1/> REGIONS: CPT | Mod: GP | Performed by: PHYSICAL THERAPIST

## 2024-01-03 RX ORDER — METHOCARBAMOL 500 MG/1
TABLET, FILM COATED ORAL
Qty: 60 TABLET | Refills: 2 | Status: SHIPPED | OUTPATIENT
Start: 2024-01-03 | End: 2024-04-05 | Stop reason: WASHOUT

## 2024-01-03 RX ORDER — MELOXICAM 15 MG/1
15 TABLET ORAL DAILY
Qty: 30 TABLET | Refills: 3 | Status: SHIPPED | OUTPATIENT
Start: 2024-01-03

## 2024-01-03 ASSESSMENT — PAIN SCALES - GENERAL
PAINLEVEL_OUTOF10: 4
PAINLEVEL_OUTOF10: 10 - WORST POSSIBLE PAIN

## 2024-01-03 ASSESSMENT — PAIN - FUNCTIONAL ASSESSMENT
PAIN_FUNCTIONAL_ASSESSMENT: 0-10
PAIN_FUNCTIONAL_ASSESSMENT: 0-10

## 2024-01-03 ASSESSMENT — PAIN DESCRIPTION - DESCRIPTORS: DESCRIPTORS: ACHING;THROBBING;SHARP

## 2024-01-03 NOTE — PROGRESS NOTES
Luh is a 63-year-old female reporting clinic today for evaluation of her chronic neck pain.    Her symptoms started several years ago, she was treated with physical therapy which significantly improved her symptoms.  Recently she has had a recurrence of symptoms.  She had right-sided neck pain that she describes as a tight stiff feeling.  She has been going to physical therapy since September, there has been mild to moderate improvement of her symptoms.  She has no pain radiating down her arms.  She denies change in her balance or fine motor skills.  She is overall neurologically tact.    Family, social, and medical histories are obtained and reviewed.    ROS: All other systems have been reviewed and are negative except as previously noted in history of present illness.    Const: Well-appearing, well-nourished female in no distress.  Eyes: Normal appearing sclera and conjunctiva, no jaundice, pupils normal in appearance.  Neck: No masses or lymphadenopathy appreciated.  Resp: breathing comfortably, normal respiratory rate rate.  CV: No upper or lower extremity edema.  Musculoskeletal: Normal gait.  Cervical ROM is limited when turning her head to the right.  Strength exam of the upper extremities reveals 5/5 strength in all major muscle groups.  No intrinsic wasting.  Negative Spurling sign.    Neuro: Sensation is intact and equal bilaterally. Deep tendon reflexes are normal and symmetric.  No clonus, negative Mansfield sign, negative Lhermitte's sign  Skin: Intact without any lesions, normal turgor.  Psych: Alert and oriented x3, normal mood and affect.    Plain films of the cervical spine were ordered today to assess for degenerative changes.    I discussed the natural history and treatment options for neck pain, including a good aerobic conditioning program, and generalized stretching and strengthening exercises.  I also recommended the use of anti-inflammatory medication, especially prior to performing vigorous  activities.  Unfortunately there is no surgical cure for neck pain, and we discussed this at length.  I recommend continuing with physical therapy.  Prescriptions for meloxicam 15 mg and methocarbamol 500 mg were sent to her pharmacy for inflammation and muscle spasms. I am optimistic the pain will improve with conservative management.  I would be happy to see her back in the future if new symptoms develop, especially pain radiating down the arms.    **This note was dictated using speech recognition software and was not corrected for spelling or grammatical errors**

## 2024-01-03 NOTE — PROGRESS NOTES
Physical Therapy  Physical Therapy Treatment    Patient Name: Luh Degroot  MRN: [id number]  Today's Date: 1/3/2024  Time Calculation  Start Time: 1030  Stop Time: 1110  Time Calculation (min): 40 min    Visit Count: 14  Auth: No  Insurance:  Employee Medical Plan    Current Problem  1. Chronic neck pain  Follow Up In Physical Therapy          General  Reason for Referral: neck pain  Referred By: Oh    Pain  Pain Assessment: 0-10  Pain Score: 4  Pain Location: Neck    Subjective:   Patient reports that she has been able to roll over in bed without much pain, which is an improvement.    HEP Compliance: Good.     Objective:   R cervical rotation: 50%    Treatments:   Therapeutic Exercise  Therapeutic Exercise Activity 1: chin tucks on defleated ball 3x10  Therapeutic Exercise Activity 2: rotation on defleated ball 2x10  Therapeutic Exercise Activity 3: lateral side bend stretch x1 min  Therapeutic Exercise Activity 4: cane IR lift 3x10  Therapeutic Exercise Activity 5: scapular punch 3x12 9#    Manual Therapy  Manual Therapy Activity 1: lateral gliding of C3-C7 x5 min  Manual Therapy Activity 2: IASTM to R UT and mid trap x10 min    Modalities  Modality 1: Untimed Mechanical Traction (x10 min)      Assessment: The pt continues to be limited by her symptoms at this time. Cervical traction was trialed today with no improvements in her symptoms. The pt is seeing an orthopedist this week for her neck. The pt is still greatly limited in her cervical rotation and her symptoms, which is limiting her functional mobility.        Education: Continue with HEP.     Plan: Re-assess symptoms       Goals:  Active       PT Problem       PT Goal 1 (Met)       Start:  10/02/23    Expected End:  10/06/23    Resolved:  11/30/23    Short Term  1. Pt will demonstrated improvements in shoulder/scapular strength, specifically to 4+/5 in 4 weeks, in order to progress back to PLOF. -MET    2. Pt will demonstrate improvements in  cervical AROM, specifically rotation 50% in 4 weeks, in order to improve functional level.  -MET    3. Pt will demonstrate the ability to complete half a work day (4 hours) with minimal pain (2-3/10 pain) in 4 weeks, in order to return to pain free prior level. -MET    4. Pt will demonstrate improved standing posture and proper shoulder and scapular control with overhead activities in 4 weeks, in order to decrease stress and strain with activities. -MET    5. Pt will demonstrate Ind ability with HEP. -MET           PT Goal 2 (Progressing)       Start:  10/02/23    Expected End:  01/05/24       Long Term Goals  1. Pt will demonstrated improvements and symmetry in shoulder/scapular strength to 5/5 in 8 weeks, in order to return to PLOF. -PROGRESSING    2. Pt will demonstrate full cervical and shoulder ROM, specifically in 8 weeks, in order to return to prior overhead function. -PROGRESSING    3. Pt will demonstrate the ability to complete all functional activities with no pain in 8 weeks. -PROGRESSING    4. Pt will demonstrate the ability to complete a full work day (8 hours) with no pain (0-1/10 pain) in 8 weeks, in order to return to pain free prior level. -PROGRESSING    5. decrease NDI by 6 points, in 8 weeks, in order to return to PLOF. -PROGRESSING                 Richardson Manning, PT, SCS, CSCS

## 2024-01-08 ENCOUNTER — APPOINTMENT (OUTPATIENT)
Dept: ORTHOPEDIC SURGERY | Facility: CLINIC | Age: 64
End: 2024-01-08
Payer: COMMERCIAL

## 2024-01-09 DIAGNOSIS — G89.29 CHRONIC NECK PAIN: Primary | ICD-10-CM

## 2024-01-09 DIAGNOSIS — M54.2 CHRONIC NECK PAIN: Primary | ICD-10-CM

## 2024-01-30 ENCOUNTER — TREATMENT (OUTPATIENT)
Dept: PHYSICAL THERAPY | Facility: CLINIC | Age: 64
End: 2024-01-30
Payer: COMMERCIAL

## 2024-01-30 DIAGNOSIS — M54.2 CHRONIC NECK PAIN: Primary | ICD-10-CM

## 2024-01-30 DIAGNOSIS — G89.29 CHRONIC NECK PAIN: Primary | ICD-10-CM

## 2024-01-30 PROCEDURE — 97140 MANUAL THERAPY 1/> REGIONS: CPT | Mod: GP | Performed by: PHYSICAL THERAPIST

## 2024-01-30 PROCEDURE — 97110 THERAPEUTIC EXERCISES: CPT | Mod: GP | Performed by: PHYSICAL THERAPIST

## 2024-01-30 ASSESSMENT — PAIN SCALES - GENERAL: PAINLEVEL_OUTOF10: 0 - NO PAIN

## 2024-01-30 ASSESSMENT — PAIN - FUNCTIONAL ASSESSMENT: PAIN_FUNCTIONAL_ASSESSMENT: 0-10

## 2024-01-30 NOTE — PROGRESS NOTES
Physical Therapy  Physical Therapy Treatment    Patient Name: Luh Degroot  MRN: [id number]  Today's Date: 1/30/2024  Time Calculation  Start Time: 1447  Stop Time: 1530  Time Calculation (min): 43 min    Visit Count: 15 (2 in 2024)  Auth: No  Insurance:  Employee Medical Plan    Current Problem  1. Chronic neck pain  Follow Up In Physical Therapy          General  Reason for Referral: neck pain  Referred By: Oh    Pain  Pain Assessment: 0-10  Pain Score: 0 - No pain  Pain Location: Neck    Subjective:   Patient reports that the pain is better.     HEP Compliance: Good.     Objective:   Cervical rotation: 60%    Treatments:   Therapeutic Exercise  Therapeutic Exercise Activity 1: pec stretch x3 min supine  Therapeutic Exercise Activity 2: Cervical snag rotation 2x10  Therapeutic Exercise Activity 3: cervical snag ext 3x10  Therapeutic Exercise Activity 4: chin tucks on defleated ball 3x10  Therapeutic Exercise Activity 5: rotation on defleated ball 2x10    Manual Therapy  Manual Therapy Activity 1: lateral gliding of C3-C7 x10 min  Manual Therapy Activity 2: IASTM to R UT and mid trap x10 min      Assessment: The focus of the session was ROM, Stretching, joint mobilizations, and soft tissue massage. The pt demonstrated Good tolerance to the noted exercises today. The pt is demonstrated Improving progress in skilled rehab at this time.  The pt demonstrated slight improvements in her cervical R rotation today with no reports of pain today. The pt is still limited in overall Strength, ROM, Flexibility, and Pain at this time. The pt continues to be a good candidate for skilled PT, in order to further improve Strength, ROM, Flexibility, and Pain.          Education: Addition of pec stretch for HEP.     Plan: Continue with ROM as tolerated.        Goals:  Active       PT Problem       PT Goal 1 (Met)       Start:  10/02/23    Expected End:  10/06/23    Resolved:  11/30/23    Short Term  1. Pt will demonstrated  improvements in shoulder/scapular strength, specifically to 4+/5 in 4 weeks, in order to progress back to PLOF. -MET    2. Pt will demonstrate improvements in cervical AROM, specifically rotation 50% in 4 weeks, in order to improve functional level.  -MET    3. Pt will demonstrate the ability to complete half a work day (4 hours) with minimal pain (2-3/10 pain) in 4 weeks, in order to return to pain free prior level. -MET    4. Pt will demonstrate improved standing posture and proper shoulder and scapular control with overhead activities in 4 weeks, in order to decrease stress and strain with activities. -MET    5. Pt will demonstrate Ind ability with HEP. -MET           PT Goal 2 (Progressing)       Start:  10/02/23    Expected End:  01/05/24       Long Term Goals  1. Pt will demonstrated improvements and symmetry in shoulder/scapular strength to 5/5 in 8 weeks, in order to return to PLOF. -PROGRESSING    2. Pt will demonstrate full cervical and shoulder ROM, specifically in 8 weeks, in order to return to prior overhead function. -PROGRESSING    3. Pt will demonstrate the ability to complete all functional activities with no pain in 8 weeks. -PROGRESSING    4. Pt will demonstrate the ability to complete a full work day (8 hours) with no pain (0-1/10 pain) in 8 weeks, in order to return to pain free prior level. -PROGRESSING    5. decrease NDI by 6 points, in 8 weeks, in order to return to PLOF. -PROGRESSING                 Richardson Manning, PT, SCS, CSCS

## 2024-02-12 ENCOUNTER — APPOINTMENT (OUTPATIENT)
Dept: INTEGRATIVE MEDICINE | Facility: CLINIC | Age: 64
End: 2024-02-12

## 2024-02-16 ENCOUNTER — TREATMENT (OUTPATIENT)
Dept: PHYSICAL THERAPY | Facility: CLINIC | Age: 64
End: 2024-02-16
Payer: COMMERCIAL

## 2024-02-16 DIAGNOSIS — G89.29 CHRONIC NECK PAIN: ICD-10-CM

## 2024-02-16 DIAGNOSIS — M54.2 CHRONIC NECK PAIN: ICD-10-CM

## 2024-02-16 PROCEDURE — 97110 THERAPEUTIC EXERCISES: CPT | Mod: GP | Performed by: PHYSICAL THERAPIST

## 2024-02-16 PROCEDURE — 97140 MANUAL THERAPY 1/> REGIONS: CPT | Mod: GP | Performed by: PHYSICAL THERAPIST

## 2024-02-16 ASSESSMENT — PAIN SCALES - GENERAL: PAINLEVEL_OUTOF10: 0 - NO PAIN

## 2024-02-16 ASSESSMENT — PAIN - FUNCTIONAL ASSESSMENT: PAIN_FUNCTIONAL_ASSESSMENT: 0-10

## 2024-02-16 NOTE — PROGRESS NOTES
Physical Therapy  Physical Therapy Treatment    Patient Name: Luh Degroot  MRN: [id number]  Today's Date: 2/16/2024  Time Calculation  Start Time: 1445  Stop Time: 1525  Time Calculation (min): 40 min    Visit Count: 16 (3 in 2024)  Auth: No  Insurance:  Employee Medical Plan    Current Problem  1. Chronic neck pain  Follow Up In Physical Therapy          General  Reason for Referral: neck pain  Referred By: Oh    Pain  Pain Assessment: 0-10  Pain Score: 0 - No pain  Pain Location: Neck    Subjective:   Patient reports that her pain is much better, where she has no pain.     HEP Compliance: Good.     Objective:   R cervical rotation: 60%    Treatments:   Therapeutic Exercise  Therapeutic Exercise Activity 1: Cervical snag rotation 3x10  Therapeutic Exercise Activity 2: cervical snag ext 3x10  Therapeutic Exercise Activity 3: shoulder flexion with RTB 3x10  Therapeutic Exercise Activity 4: physioball roll out 3x12  Therapeutic Exercise Activity 5: sidelying ER 3x12 3#    Manual Therapy  Manual Therapy Activity 1: lateral gliding of C3-C7 x10 min  Manual Therapy Activity 2: IASTM to R UT and mid trap x5 min      Assessment: The focus of the session was Strengthening, ROM, joint mobilizations, and soft tissue massage. The pt demonstrated Good tolerance to the noted exercises today. The pt is demonstrated Fair progress in skilled rehab at this time. The pt demonstrated improvements in her pain level and R cervical rotation. The pt is still limited in overall Strength, ROM, Flexibility, and Motor control at this time. The pt continues to be a good candidate for skilled PT, in order to further improve Strength, ROM, Flexibility, and Motor control.         Education: Access Code: [email or web address]  URL: [email or web address]  Date: 02/16/2024  Prepared by: Richardson Manning    Exercises  - Supine Chin Tucks on Flat Ball  - 1 x daily - 7 x weekly - 2 sets - 10 reps  - Supine Cervical Rotation AROM on Flat  Ball  - 1 x daily - 7 x weekly - 2 sets - 10 reps  - Cervical Extension AROM with Strap  - 2 x daily - 7 x weekly - 3 sets - 10 reps  - Seated Assisted Cervical Rotation with Towel  - 2 x daily - 7 x weekly - 2 sets - 15 reps  - Supine Chest Stretch on Foam Roll  - 1 x daily - 7 x weekly - 1-2 min hold  - Doorway Pec Stretch at 60 Degrees Abduction with Arm Straight  - 1 x daily - 7 x weekly - 1 sets - 1 min hold  - Standing Single Arm Shoulder Flexion Towel Slide at Table Top  - 1 x daily - 7 x weekly - 3 sets - 10 reps  - Sidelying Shoulder ER with Towel and Dumbbell  - 1 x daily - 7 x weekly - 3 sets - 12 reps    Plan: Continue with motion training.        Goals:  Active       PT Problem       PT Goal 1 (Met)       Start:  10/02/23    Expected End:  10/06/23    Resolved:  11/30/23    Short Term  1. Pt will demonstrated improvements in shoulder/scapular strength, specifically to 4+/5 in 4 weeks, in order to progress back to PLOF. -MET    2. Pt will demonstrate improvements in cervical AROM, specifically rotation 50% in 4 weeks, in order to improve functional level.  -MET    3. Pt will demonstrate the ability to complete half a work day (4 hours) with minimal pain (2-3/10 pain) in 4 weeks, in order to return to pain free prior level. -MET    4. Pt will demonstrate improved standing posture and proper shoulder and scapular control with overhead activities in 4 weeks, in order to decrease stress and strain with activities. -MET    5. Pt will demonstrate Ind ability with HEP. -MET           PT Goal 2 (Progressing)       Start:  10/02/23    Expected End:  01/05/24       Long Term Goals  1. Pt will demonstrated improvements and symmetry in shoulder/scapular strength to 5/5 in 8 weeks, in order to return to PLOF. -PROGRESSING    2. Pt will demonstrate full cervical and shoulder ROM, specifically in 8 weeks, in order to return to prior overhead function. -PROGRESSING    3. Pt will demonstrate the ability to complete all  functional activities with no pain in 8 weeks. -PROGRESSING    4. Pt will demonstrate the ability to complete a full work day (8 hours) with no pain (0-1/10 pain) in 8 weeks, in order to return to pain free prior level. -PROGRESSING    5. decrease NDI by 6 points, in 8 weeks, in order to return to PLOF. -PROGRESSING                 Richardson Manning, PT, SCS, CSCS

## 2024-02-27 ENCOUNTER — TREATMENT (OUTPATIENT)
Dept: PHYSICAL THERAPY | Facility: CLINIC | Age: 64
End: 2024-02-27
Payer: COMMERCIAL

## 2024-02-27 DIAGNOSIS — G89.29 CHRONIC NECK PAIN: Primary | ICD-10-CM

## 2024-02-27 DIAGNOSIS — M54.2 CHRONIC NECK PAIN: Primary | ICD-10-CM

## 2024-02-27 PROCEDURE — 97140 MANUAL THERAPY 1/> REGIONS: CPT | Mod: GP | Performed by: PHYSICAL THERAPIST

## 2024-02-27 PROCEDURE — 97110 THERAPEUTIC EXERCISES: CPT | Mod: GP | Performed by: PHYSICAL THERAPIST

## 2024-02-27 ASSESSMENT — PAIN - FUNCTIONAL ASSESSMENT: PAIN_FUNCTIONAL_ASSESSMENT: 0-10

## 2024-02-27 ASSESSMENT — PAIN SCALES - GENERAL: PAINLEVEL_OUTOF10: 0 - NO PAIN

## 2024-02-27 NOTE — PROGRESS NOTES
Physical Therapy  Physical Therapy Treatment    Patient Name: Luh Degroot  MRN: [id number]  Today's Date: 2/27/2024  Time Calculation  Start Time: 1445  Stop Time: 1525  Time Calculation (min): 40 min    Visit Count: 17 (4 in 2024)  Auth: No  Insurance:  Employee Medical Plan    Current Problem  1. Chronic neck pain            General  Reason for Referral: neck pain  Referred By: Oh    Pain  Pain Assessment: 0-10  Pain Score: 0 - No pain  Pain Location: Neck    Subjective:   Patient reports that her neck feels about the same today.     HEP Compliance: good.     Objective:   R cervical rotation: 75%    Treatments:   Therapeutic Exercise  Therapeutic Exercise Activity 1: Cervical snag rotation 3x10  Therapeutic Exercise Activity 2: cervical snag ext 3x10  Therapeutic Exercise Activity 3: chin tucks on defleated ball 3x10  Therapeutic Exercise Activity 4: rotation on defleated ball 2x10  Therapeutic Exercise Activity 5: pec stretch x3 min supine  Therapeutic Exercise Activity 6: sidelying ER 3x12 3#  Therapeutic Exercise Activity 7: thoracic ext 2x10    Manual Therapy  Manual Therapy Activity 1: lateral gliding of C3-C7 x10 min  Manual Therapy Activity 2: IASTM to R UT and mid trap x5 min      Assessment: The focus of the session was ROM, Stretching, joint mobilizations, and soft tissue massage. The pt demonstrated Good tolerance to the noted exercises today. The pt is demonstrated Improving progress in skilled rehab at this time. The pt demonstrated improvements in her cervical rotation to 75% today after PROM/AAROM exercises and manual techniques. Pt reported no stretching sensations after manual techniques. The pt is still limited in overall ROM, Flexibility, Motor control, and Pain at this time. The pt continues to be a good candidate for skilled PT, in order to further improve ROM, Flexibility, Motor control, and Pain.          Education: Continue with noted HEP.     Plan: Continue progression in R  cervical rotation.        Goals:  Active       PT Problem       PT Goal 1 (Met)       Start:  10/02/23    Expected End:  10/06/23    Resolved:  11/30/23    Short Term  1. Pt will demonstrated improvements in shoulder/scapular strength, specifically to 4+/5 in 4 weeks, in order to progress back to PLOF. -MET    2. Pt will demonstrate improvements in cervical AROM, specifically rotation 50% in 4 weeks, in order to improve functional level.  -MET    3. Pt will demonstrate the ability to complete half a work day (4 hours) with minimal pain (2-3/10 pain) in 4 weeks, in order to return to pain free prior level. -MET    4. Pt will demonstrate improved standing posture and proper shoulder and scapular control with overhead activities in 4 weeks, in order to decrease stress and strain with activities. -MET    5. Pt will demonstrate Ind ability with HEP. -MET           PT Goal 2 (Progressing)       Start:  10/02/23    Expected End:  01/05/24       Long Term Goals  1. Pt will demonstrated improvements and symmetry in shoulder/scapular strength to 5/5 in 8 weeks, in order to return to PLOF. -PROGRESSING    2. Pt will demonstrate full cervical and shoulder ROM, specifically in 8 weeks, in order to return to prior overhead function. -PROGRESSING    3. Pt will demonstrate the ability to complete all functional activities with no pain in 8 weeks. -PROGRESSING    4. Pt will demonstrate the ability to complete a full work day (8 hours) with no pain (0-1/10 pain) in 8 weeks, in order to return to pain free prior level. -PROGRESSING    5. decrease NDI by 6 points, in 8 weeks, in order to return to PLOF. -PROGRESSING                 Richardson Manning, PT, SCS, CSCS

## 2024-03-05 ENCOUNTER — TREATMENT (OUTPATIENT)
Dept: PHYSICAL THERAPY | Facility: CLINIC | Age: 64
End: 2024-03-05
Payer: COMMERCIAL

## 2024-03-05 DIAGNOSIS — G89.29 CHRONIC NECK PAIN: Primary | ICD-10-CM

## 2024-03-05 DIAGNOSIS — M54.2 CHRONIC NECK PAIN: Primary | ICD-10-CM

## 2024-03-05 PROCEDURE — 97110 THERAPEUTIC EXERCISES: CPT | Mod: GP | Performed by: PHYSICAL THERAPIST

## 2024-03-05 PROCEDURE — 97140 MANUAL THERAPY 1/> REGIONS: CPT | Mod: GP | Performed by: PHYSICAL THERAPIST

## 2024-03-05 ASSESSMENT — PAIN - FUNCTIONAL ASSESSMENT: PAIN_FUNCTIONAL_ASSESSMENT: 0-10

## 2024-03-05 ASSESSMENT — PAIN SCALES - GENERAL: PAINLEVEL_OUTOF10: 0 - NO PAIN

## 2024-03-05 NOTE — PROGRESS NOTES
Physical Therapy  Physical Therapy Treatment    Patient Name: Luh Degroot  MRN: [id number]  Today's Date: 3/5/2024  Time Calculation  Start Time: 0750  Stop Time: 0830  Time Calculation (min): 40 min    Visit Count: 18 (5 in 2024)  Auth: No  Insurance:  Employee Medical Plan    Current Problem  1. Chronic neck pain            General  Reason for Referral: neck pain  Referred By: Oh    Pain  Pain Assessment: 0-10  Pain Score: 0 - No pain  Pain Location: Neck    Subjective:   Patient reports that she is still reporting with stiffness.    HEP Compliance: Good.     Objective:   75-80% R cervical rotation with over pressure.     Treatments:   Therapeutic Exercise  Therapeutic Exercise Activity 1: Cervical snag rotation 3x10  Therapeutic Exercise Activity 2: cervical snag ext 3x10  Therapeutic Exercise Activity 3: pec stretch x3 min supine  Therapeutic Exercise Activity 4: thoracic ext 2x10  Therapeutic Exercise Activity 5: shoulder shrugs 3x12 5#    Manual Therapy  Manual Therapy Activity 1: lateral gliding of C3-C7 x10 min  Manual Therapy Activity 2: IASTM to R UT and mid trap x5 min      Assessment: The goal of the session was continued ROM and mobility training of the cervical spine and the thoracic spine. The pt is slowly progressing in her R cervical rotation. The pt does though demonstrate limitations in her shoulder trap strength, where shrugs were re-introduced to the patient with good control. The pt is progressing at this time.     Education: Access Code: [email or web address]  URL: [email or web address]  Date: 03/05/2024  Prepared by: Richardson Manning    Exercises  - Supine Chin Tucks on Flat Ball  - 1 x daily - 7 x weekly - 2 sets - 10 reps  - Supine Cervical Rotation AROM on Flat Ball  - 1 x daily - 7 x weekly - 2 sets - 10 reps  - Cervical Extension AROM with Strap  - 2 x daily - 7 x weekly - 3 sets - 10 reps  - Seated Assisted Cervical Rotation with Towel  - 2 x daily - 7 x weekly - 2 sets  - 15 reps  - Supine Chest Stretch on Foam Roll  - 1 x daily - 7 x weekly - 1-2 min hold  - Doorway Pec Stretch at 60 Degrees Abduction with Arm Straight  - 1 x daily - 7 x weekly - 1 sets - 1 min hold  - Sidelying Shoulder ER with Towel and Dumbbell  - 1 x daily - 7 x weekly - 3 sets - 12 reps  - Standing Backward Shoulder Shrug with Dumbbells  - 1 x daily - 7 x weekly - 3 sets - 12 reps    Plan: continue with trap strengthening.        Goals:  Active       PT Problem       PT Goal 1 (Met)       Start:  10/02/23    Expected End:  10/06/23    Resolved:  11/30/23    Short Term  1. Pt will demonstrated improvements in shoulder/scapular strength, specifically to 4+/5 in 4 weeks, in order to progress back to PLOF. -MET    2. Pt will demonstrate improvements in cervical AROM, specifically rotation 50% in 4 weeks, in order to improve functional level.  -MET    3. Pt will demonstrate the ability to complete half a work day (4 hours) with minimal pain (2-3/10 pain) in 4 weeks, in order to return to pain free prior level. -MET    4. Pt will demonstrate improved standing posture and proper shoulder and scapular control with overhead activities in 4 weeks, in order to decrease stress and strain with activities. -MET    5. Pt will demonstrate Ind ability with HEP. -MET           PT Goal 2 (Progressing)       Start:  10/02/23    Expected End:  01/05/24       Long Term Goals  1. Pt will demonstrated improvements and symmetry in shoulder/scapular strength to 5/5 in 8 weeks, in order to return to PLOF. -PROGRESSING    2. Pt will demonstrate full cervical and shoulder ROM, specifically in 8 weeks, in order to return to prior overhead function. -PROGRESSING    3. Pt will demonstrate the ability to complete all functional activities with no pain in 8 weeks. -PROGRESSING    4. Pt will demonstrate the ability to complete a full work day (8 hours) with no pain (0-1/10 pain) in 8 weeks, in order to return to pain free prior level.  -PROGRESSING    5. decrease NDI by 6 points, in 8 weeks, in order to return to PLOF. -PROGRESSING                 Richardson Manning, PT, SCS, CSCS

## 2024-03-22 ENCOUNTER — TREATMENT (OUTPATIENT)
Dept: PHYSICAL THERAPY | Facility: CLINIC | Age: 64
End: 2024-03-22
Payer: COMMERCIAL

## 2024-03-22 DIAGNOSIS — G89.29 CHRONIC NECK PAIN: Primary | ICD-10-CM

## 2024-03-22 DIAGNOSIS — M54.2 CHRONIC NECK PAIN: Primary | ICD-10-CM

## 2024-03-22 PROCEDURE — 97140 MANUAL THERAPY 1/> REGIONS: CPT | Mod: GP | Performed by: PHYSICAL THERAPIST

## 2024-03-22 PROCEDURE — 97110 THERAPEUTIC EXERCISES: CPT | Mod: GP | Performed by: PHYSICAL THERAPIST

## 2024-03-22 ASSESSMENT — PAIN SCALES - GENERAL: PAINLEVEL_OUTOF10: 1

## 2024-03-22 ASSESSMENT — PAIN - FUNCTIONAL ASSESSMENT: PAIN_FUNCTIONAL_ASSESSMENT: 0-10

## 2024-03-22 NOTE — PROGRESS NOTES
Physical Therapy  Physical Therapy Treatment    Patient Name: Luh Degroot  MRN: [id number]  Today's Date: 3/22/2024  Time Calculation  Start Time: 1448  Stop Time: 1528  Time Calculation (min): 40 min    Visit Count: 19 (6 in 2024)  Auth: No  Insurance:  Employee Medical Plan    Current Problem  1. Chronic neck pain            General  Reason for Referral: neck pain  Referred By: Oh    Pain  Pain Assessment: 0-10  Pain Score: 1  Pain Location: Neck    Subjective:   Patient reports that she has had a slight increase in soreness today at the base of the skull today. Only day she has had symptoms.     HEP Compliance: Good.     Objective:   Cervical flexion: 90%      Treatments:   Therapeutic Exercise  Therapeutic Exercise Activity 1: chin tucks x30  Therapeutic Exercise Activity 2: Cervical snag rotation 3x10  Therapeutic Exercise Activity 3: cervical snag ext 3x10  Therapeutic Exercise Activity 4: open books x20    Manual Therapy  Manual Therapy Activity 1: lateral gliding of C3-C7 x10 min  Manual Therapy Activity 2: IASTM to R UT and mid trap x5 min      Assessment: The focus of the session was Strengthening, ROM, Stretching, joint mobilizations, and soft tissue massage. The pt demonstrated Fair tolerance to the noted exercises today. The pt is demonstrated Good progress in skilled rehab at this time. The pt is still limited in overall ROM at this time. The pt continues to be a good candidate for skilled PT, in order to further improve ROM.         Education: Continue with noted HEP.     Plan: Discharge next visit.        Goals:  Active       PT Problem       PT Goal 1 (Met)       Start:  10/02/23    Expected End:  10/06/23    Resolved:  11/30/23    Short Term  1. Pt will demonstrated improvements in shoulder/scapular strength, specifically to 4+/5 in 4 weeks, in order to progress back to PLOF. -MET    2. Pt will demonstrate improvements in cervical AROM, specifically rotation 50% in 4 weeks, in order to  improve functional level.  -MET    3. Pt will demonstrate the ability to complete half a work day (4 hours) with minimal pain (2-3/10 pain) in 4 weeks, in order to return to pain free prior level. -MET    4. Pt will demonstrate improved standing posture and proper shoulder and scapular control with overhead activities in 4 weeks, in order to decrease stress and strain with activities. -MET    5. Pt will demonstrate Ind ability with HEP. -MET           PT Goal 2 (Progressing)       Start:  10/02/23    Expected End:  01/05/24       Long Term Goals  1. Pt will demonstrated improvements and symmetry in shoulder/scapular strength to 5/5 in 8 weeks, in order to return to PLOF. -PROGRESSING    2. Pt will demonstrate full cervical and shoulder ROM, specifically in 8 weeks, in order to return to prior overhead function. -PROGRESSING    3. Pt will demonstrate the ability to complete all functional activities with no pain in 8 weeks. -PROGRESSING    4. Pt will demonstrate the ability to complete a full work day (8 hours) with no pain (0-1/10 pain) in 8 weeks, in order to return to pain free prior level. -PROGRESSING    5. decrease NDI by 6 points, in 8 weeks, in order to return to PLOF. -PROGRESSING                 Richardson Manning, PT, SCS, CSCS

## 2024-04-01 NOTE — PROGRESS NOTES
Physical Therapy  Physical Therapy Orthopedic Progress Note    Patient Name: Luh Degroot  MRN: [id number]  Today's Date: 4/2/2024  Time Calculation  Start Time: 0745  Stop Time: 0825  Time Calculation (min): 40 min    Visit Count: 20 (7 in 2024)  Auth: No  Insurance:  Employee Medical Plan    Current Problem  1. Chronic neck pain            General:  General  Reason for Referral: neck pain  Referred By: Oh    Pain:  Pain Assessment: 0-10  Pain Score: 0 - No pain  Pain Location: Neck    Subjective:  Subjective   Patient reports that she is feeling     Current Condition: Better    HEP Compliance: Good    Self Reported Function (0-100%) = 80%  - 100% being back to PLOF    Objective:  Cervical Spine     Cervical AROM  Cervical flexion: (80°): 100%  Cervical extension: (50°): 100%  Cervical rotation right: (80°): 75%  Cervical rotation left: (80°): 100%  Cervical sidebend right: (45°): 75%  Cervical sidebend left: (45°): 75%  Shoulder AROM  R shoulder flexion: (180°): 170  L shoulder flexion: (180°): 170  R shoulder ER: (90°): 90  L shoulder ER: (90°): 90  R shoulder IR: (70°): T5  L shoulder IR: (70°): T4     Shoulder Strength  R shoulder flexion: (5/5): 5  L shoulder flexion: (5/5): 5  R shoulder extension: (5/5): 5  R shoulder abduction: (5/5): 5  L shoulder abduction: (5/5): 5  R shoulder ER: (5/5): 5  L shoulder ER: (5/5): 5  R shoulder IR: (5/5) : 5  L shoulder IR: (5/5): 5  Scapular MMT  R lower trapezius: (5/5): 5  L lower trapezius: (5/5): 5  R middle trapezius: (5/5): 5  L middle trapezius: (5/5): 5  R rhomboids: (5/5): 5  L rhomboids: (5/5): 5    Outcome Measures: Updated 4/2/2024  Other Measures  Neck Disability Index: 8, 16%       Treatments:   Therapeutic Exercise  Therapeutic Exercise Activity 1: objective measures  Therapeutic Exercise Activity 2: Cervical snag rotation 3x10  Therapeutic Exercise Activity 3: cervical snag ext 3x10  Therapeutic Exercise Activity 4: open books x20  Therapeutic  Exercise Activity 5: chin tucks x30  Therapeutic Exercise Activity 6: cervical rotations x30  Therapeutic Exercise Activity 7: scapular row x30 GTB  Therapeutic Exercise Activity 8: lat row x30 GTB  Therapeutic Exercise Activity 9: pec stretch x2 min    Assessment: the pt has progressed well during her time in care. However, the pt has reached maximal benefit in skilled PT. The pt does not have any pain at this time, however, is still limited in her R cervical rotation. The pt is being discharged with an IND HEP. The pt agreed with this plan at this time.        EDUCATION: home exercise program, plan of care, activity modifications, pain management, and injury pathology   Access Code: [email or web address]  URL: [email or web address]  Date: 04/02/2024  Prepared by: Richardson Manning    Exercises  - Cervical Extension AROM with Strap  - 1 x daily - 3 x weekly - 3 sets - 10 reps  - Seated Assisted Cervical Rotation with Towel  - 1 x daily - 3 x weekly - 3 sets - 10 reps  - Supine Chin Tuck  - 1 x daily - 3 x weekly - 3 sets - 10 reps  - Supine Cervical Rotation AROM on Flat Ball  - 1 x daily - 3 x weekly - 3 sets - 10 reps  - Sidelying Thoracic Rotation with Open Book  - 1 x daily - 3 x weekly - 2 sets - 10 reps  - Standing Shoulder Row with Anchored Resistance  - 1 x daily - 3 x weekly - 3 sets - 10 reps  - Shoulder extension with resistance - Neutral  - 1 x daily - 3 x weekly - 3 sets - 10 reps  - Supine Thoracic Mobilization Towel Roll Vertical with Arm Stretch  - 1 x daily - 3 x weekly - 3 sets - 1-2 min hold    Plan of Care: Updated 4/2/2024     Discharge: Yes    Goals: Updated 4/2/2024  Resolved       PT Problem       PT Goal 1 (Met)       Start:  10/02/23    Expected End:  10/06/23    Resolved:  11/30/23    Short Term  1. Pt will demonstrated improvements in shoulder/scapular strength, specifically to 4+/5 in 4 weeks, in order to progress back to PLOF. -MET    2. Pt will demonstrate improvements in cervical  AROM, specifically rotation 50% in 4 weeks, in order to improve functional level.  -MET    3. Pt will demonstrate the ability to complete half a work day (4 hours) with minimal pain (2-3/10 pain) in 4 weeks, in order to return to pain free prior level. -MET    4. Pt will demonstrate improved standing posture and proper shoulder and scapular control with overhead activities in 4 weeks, in order to decrease stress and strain with activities. -MET    5. Pt will demonstrate Ind ability with HEP. -MET           PT Goal 2 (Met)       Start:  10/02/23    Expected End:  24    Resolved:  24    Long Term Goals  1. Pt will demonstrated improvements and symmetry in shoulder/scapular strength to 5/5 in 8 weeks, in order to return to PLOF. -MET    2. Pt will demonstrate full cervical and shoulder ROM, specifically in 8 weeks, in order to return to prior overhead function. -MET    3. Pt will demonstrate the ability to complete all functional activities with no pain in 8 weeks. -MET    4. Pt will demonstrate the ability to complete a full work day (8 hours) with no pain (0-1/10 pain) in 8 weeks, in order to return to pain free prior level. -MET    5. decrease NDI by 6 points, in 8 weeks, in order to return to PLOF. -NOT MET                 Physical Therapy    Discharge Summary    Name: Luh Degroot  MRN: [id number]  : 1960  Date: 24    Discharge Summary: PT    Discharge Information: Date of discharge 24, Date of last visit 24, Date of evaluation 23, Number of attended visits 20, Referred by randee, and Referred for neck pain.    Discharge Status: Progress plateaued     Rehab Discharge Reason: Progress plateaued; further improvement possible      Richardson Manning, PT, SCS, CSCS

## 2024-04-02 ENCOUNTER — TREATMENT (OUTPATIENT)
Dept: PHYSICAL THERAPY | Facility: CLINIC | Age: 64
End: 2024-04-02
Payer: COMMERCIAL

## 2024-04-02 DIAGNOSIS — G89.29 CHRONIC NECK PAIN: Primary | ICD-10-CM

## 2024-04-02 DIAGNOSIS — M54.2 CHRONIC NECK PAIN: Primary | ICD-10-CM

## 2024-04-02 PROCEDURE — 97110 THERAPEUTIC EXERCISES: CPT | Mod: GP | Performed by: PHYSICAL THERAPIST

## 2024-04-02 ASSESSMENT — PAIN - FUNCTIONAL ASSESSMENT: PAIN_FUNCTIONAL_ASSESSMENT: 0-10

## 2024-04-02 ASSESSMENT — PAIN SCALES - GENERAL: PAINLEVEL_OUTOF10: 0 - NO PAIN

## 2024-04-05 ENCOUNTER — OFFICE VISIT (OUTPATIENT)
Dept: OBSTETRICS AND GYNECOLOGY | Facility: CLINIC | Age: 64
End: 2024-04-05
Payer: COMMERCIAL

## 2024-04-05 VITALS
HEIGHT: 67 IN | BODY MASS INDEX: 22.29 KG/M2 | DIASTOLIC BLOOD PRESSURE: 75 MMHG | SYSTOLIC BLOOD PRESSURE: 121 MMHG | WEIGHT: 142 LBS

## 2024-04-05 DIAGNOSIS — Z12.31 BREAST CANCER SCREENING BY MAMMOGRAM: ICD-10-CM

## 2024-04-05 DIAGNOSIS — Z01.419 NORMAL GYNECOLOGIC EXAMINATION: Primary | ICD-10-CM

## 2024-04-05 DIAGNOSIS — N95.2 VAGINAL ATROPHY: ICD-10-CM

## 2024-04-05 PROCEDURE — 88175 CYTOPATH C/V AUTO FLUID REDO: CPT

## 2024-04-05 PROCEDURE — 88141 CYTOPATH C/V INTERPRET: CPT | Performed by: PATHOLOGY

## 2024-04-05 PROCEDURE — 87624 HPV HI-RISK TYP POOLED RSLT: CPT

## 2024-04-05 PROCEDURE — 1036F TOBACCO NON-USER: CPT | Performed by: OBSTETRICS & GYNECOLOGY

## 2024-04-05 PROCEDURE — 99396 PREV VISIT EST AGE 40-64: CPT | Performed by: OBSTETRICS & GYNECOLOGY

## 2024-04-05 RX ORDER — CLOBETASOL PROPIONATE 0.5 MG/G
CREAM TOPICAL
COMMUNITY

## 2024-04-05 ASSESSMENT — ENCOUNTER SYMPTOMS
CARDIOVASCULAR NEGATIVE: 1
MUSCULOSKELETAL NEGATIVE: 1
RESPIRATORY NEGATIVE: 1
GASTROINTESTINAL NEGATIVE: 1
HEMATOLOGIC/LYMPHATIC NEGATIVE: 1
PSYCHIATRIC NEGATIVE: 1
ALLERGIC/IMMUNOLOGIC NEGATIVE: 1
ENDOCRINE NEGATIVE: 1
CONSTITUTIONAL NEGATIVE: 1
NEUROLOGICAL NEGATIVE: 1
EYES NEGATIVE: 1

## 2024-04-05 ASSESSMENT — PAIN SCALES - GENERAL: PAINLEVEL: 0-NO PAIN

## 2024-04-05 NOTE — PROGRESS NOTES
Subjective   Patient ID: Luh Degroot is a 63 y.o. female who presents for Annual Exam (Last pap:  2/13/2019  neg/neg/Last kaycee:  3/16/2023  cat 1/Last colon screen:  9/22/2023/Katia bonner.   Yamileth Sullivan LPN).  HPI patient is here for annual visit she has no complaints patient last Pap test was 5 years ago we are going to repeat Pap test patient also will need mammogram in addition we will call to hysteroscopy pharmacy estradiol vaginal cream for vaginal atrophy    Review of Systems   Constitutional: Negative.    Eyes: Negative.    Respiratory: Negative.     Cardiovascular: Negative.    Gastrointestinal: Negative.    Endocrine: Negative.    Genitourinary: Negative.    Musculoskeletal: Negative.    Skin: Negative.    Allergic/Immunologic: Negative.    Neurological: Negative.    Hematological: Negative.    Psychiatric/Behavioral: Negative.         Objective   Physical Exam  Constitutional:       Appearance: Normal appearance.   HENT:      Head: Normocephalic and atraumatic.   Cardiovascular:      Rate and Rhythm: Normal rate and regular rhythm.      Pulses: Normal pulses.      Heart sounds: Normal heart sounds.   Pulmonary:      Effort: Pulmonary effort is normal.      Breath sounds: Normal breath sounds.   Abdominal:      General: Abdomen is flat. Bowel sounds are normal.      Palpations: Abdomen is soft.      Hernia: There is no hernia in the left inguinal area or right inguinal area.   Genitourinary:     General: Normal vulva.      Exam position: Lithotomy position.      Labia:         Right: No rash, tenderness or lesion.         Left: No rash, tenderness or lesion.       Urethra: No prolapse.      Vagina: Normal.      Cervix: Normal.      Uterus: Normal.       Adnexa: Right adnexa normal and left adnexa normal.   Musculoskeletal:      Cervical back: Normal range of motion and neck supple.   Skin:     General: Skin is warm and dry.   Neurological:      General: No focal deficit present.      Mental  Status: She is alert and oriented to person, place, and time.         Assessment/Plan    normal postmenopausal gynecologic examination  Vaginal atrophy  Mammogram  Estradiol vaginal cream refill for 1 year         Ronald Fernández MD 04/05/24 3:40 PM

## 2024-04-25 ENCOUNTER — HOSPITAL ENCOUNTER (OUTPATIENT)
Dept: RADIOLOGY | Facility: CLINIC | Age: 64
Discharge: HOME | End: 2024-04-25
Payer: COMMERCIAL

## 2024-04-25 VITALS — BODY MASS INDEX: 21.97 KG/M2 | HEIGHT: 67 IN | WEIGHT: 140 LBS

## 2024-04-25 DIAGNOSIS — Z12.31 BREAST CANCER SCREENING BY MAMMOGRAM: ICD-10-CM

## 2024-04-25 PROCEDURE — 77067 SCR MAMMO BI INCL CAD: CPT | Performed by: RADIOLOGY

## 2024-04-25 PROCEDURE — 77063 BREAST TOMOSYNTHESIS BI: CPT | Performed by: RADIOLOGY

## 2024-04-25 PROCEDURE — 77067 SCR MAMMO BI INCL CAD: CPT

## 2024-12-11 ENCOUNTER — APPOINTMENT (OUTPATIENT)
Dept: OPHTHALMOLOGY | Facility: CLINIC | Age: 64
End: 2024-12-11
Payer: COMMERCIAL

## 2024-12-11 DIAGNOSIS — H52.223 REGULAR ASTIGMATISM OF BOTH EYES: Primary | ICD-10-CM

## 2024-12-11 DIAGNOSIS — H52.4 BILATERAL PRESBYOPIA: ICD-10-CM

## 2024-12-11 PROCEDURE — 92014 COMPRE OPH EXAM EST PT 1/>: CPT | Performed by: OPTOMETRIST

## 2024-12-11 PROCEDURE — 92015 DETERMINE REFRACTIVE STATE: CPT | Performed by: OPTOMETRIST

## 2024-12-11 ASSESSMENT — VISUAL ACUITY
OS_SC: 20/30
OS_SC+: -
OS_PH_SC: 20/20
OD_PH_SC+: -2
METHOD: SNELLEN - LINEAR
OS_SC: J1+
OD_SC: J1+
OD_SC: 20/30
OD_PH_SC: 20/20

## 2024-12-11 ASSESSMENT — CONF VISUAL FIELD
OS_INFERIOR_NASAL_RESTRICTION: 0
OS_INFERIOR_TEMPORAL_RESTRICTION: 0
OD_SUPERIOR_NASAL_RESTRICTION: 0
OS_NORMAL: 1
OS_SUPERIOR_TEMPORAL_RESTRICTION: 0
OD_NORMAL: 1
OD_INFERIOR_TEMPORAL_RESTRICTION: 0
METHOD: COUNTING FINGERS
OD_SUPERIOR_TEMPORAL_RESTRICTION: 0
OD_INFERIOR_NASAL_RESTRICTION: 0
OS_SUPERIOR_NASAL_RESTRICTION: 0

## 2024-12-11 ASSESSMENT — TONOMETRY
OD_IOP_MMHG: 13
IOP_METHOD: GOLDMANN APPLANATION
OS_IOP_MMHG: 13

## 2024-12-11 ASSESSMENT — REFRACTION_WEARINGRX
OS_CYLINDER: -1.50
OD_SPHERE: +0.50
OD_AXIS: 100
OS_ADD: +2.50
OS_SPHERE: -0.50
OD_ADD: +2.50
OS_AXIS: 090
OD_CYLINDER: -2.00

## 2024-12-11 ASSESSMENT — ENCOUNTER SYMPTOMS
ENDOCRINE NEGATIVE: 0
PSYCHIATRIC NEGATIVE: 0
ALLERGIC/IMMUNOLOGIC NEGATIVE: 0
NEUROLOGICAL NEGATIVE: 0
CARDIOVASCULAR NEGATIVE: 0
HEMATOLOGIC/LYMPHATIC NEGATIVE: 0
EYES NEGATIVE: 1
GASTROINTESTINAL NEGATIVE: 0
MUSCULOSKELETAL NEGATIVE: 0
RESPIRATORY NEGATIVE: 0
CONSTITUTIONAL NEGATIVE: 0

## 2024-12-11 ASSESSMENT — REFRACTION_MANIFEST
OS_SPHERE: -0.25
OS_AXIS: 090
OD_ADD: +2.50
OD_SPHERE: +0.25
OS_CYLINDER: -1.50
OD_CYLINDER: -2.00
OD_AXIS: 100
OS_ADD: +2.50

## 2024-12-11 ASSESSMENT — EXTERNAL EXAM - LEFT EYE: OS_EXAM: NORMAL

## 2024-12-11 ASSESSMENT — CUP TO DISC RATIO
OS_RATIO: 0.55
OD_RATIO: 0.55

## 2024-12-11 ASSESSMENT — EXTERNAL EXAM - RIGHT EYE: OD_EXAM: NORMAL

## 2024-12-11 NOTE — PROGRESS NOTES
A spectacle prescription was given at the patient`s request.  Can get distance glasses as well.      DED use AT`s.  If needs to use over QID then RTC for dry eye testing.      PVD OU.      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  The patient was asked to return to our clinic or seek out eye care ASAP if new flashes of light or floaters are noted.       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 The patient was asked to return to our clinic in one year or sooner if ocular or vision changes occur.     1 year manifest refraction (MR) DFE.

## 2025-01-02 ENCOUNTER — APPOINTMENT (OUTPATIENT)
Dept: OBSTETRICS AND GYNECOLOGY | Facility: CLINIC | Age: 65
End: 2025-01-02
Payer: COMMERCIAL

## 2025-01-03 ENCOUNTER — APPOINTMENT (OUTPATIENT)
Dept: OBSTETRICS AND GYNECOLOGY | Facility: CLINIC | Age: 65
End: 2025-01-03
Payer: COMMERCIAL

## 2025-01-03 ENCOUNTER — TELEPHONE (OUTPATIENT)
Dept: OBSTETRICS AND GYNECOLOGY | Facility: CLINIC | Age: 65
End: 2025-01-03

## 2025-01-07 ENCOUNTER — APPOINTMENT (OUTPATIENT)
Dept: DERMATOLOGY | Facility: CLINIC | Age: 65
End: 2025-01-07
Payer: COMMERCIAL

## 2025-01-07 DIAGNOSIS — D22.9 MULTIPLE BENIGN NEVI: ICD-10-CM

## 2025-01-07 DIAGNOSIS — L57.0 ACTINIC KERATOSIS: Primary | ICD-10-CM

## 2025-01-07 DIAGNOSIS — L57.8 ACTINIC SKIN DAMAGE: ICD-10-CM

## 2025-01-07 DIAGNOSIS — Z80.8 FAMILY HISTORY OF SKIN CANCER: ICD-10-CM

## 2025-01-07 DIAGNOSIS — L81.4 LENTIGO: ICD-10-CM

## 2025-01-07 DIAGNOSIS — Z12.83 SCREENING EXAM FOR SKIN CANCER: ICD-10-CM

## 2025-01-07 DIAGNOSIS — L82.1 SEBORRHEIC KERATOSIS: ICD-10-CM

## 2025-01-07 PROCEDURE — 1036F TOBACCO NON-USER: CPT | Performed by: DERMATOLOGY

## 2025-01-07 PROCEDURE — 1159F MED LIST DOCD IN RCRD: CPT | Performed by: DERMATOLOGY

## 2025-01-07 PROCEDURE — 99213 OFFICE O/P EST LOW 20 MIN: CPT | Performed by: DERMATOLOGY

## 2025-01-07 PROCEDURE — 1160F RVW MEDS BY RX/DR IN RCRD: CPT | Performed by: DERMATOLOGY

## 2025-01-07 ASSESSMENT — DERMATOLOGY QUALITY OF LIFE (QOL) ASSESSMENT
RATE HOW BOTHERED YOU ARE BY EFFECTS OF YOUR SKIN PROBLEMS ON YOUR ACTIVITIES (EG, GOING OUT, ACCOMPLISHING WHAT YOU WANT, WORK ACTIVITIES OR YOUR RELATIONSHIPS WITH OTHERS): 0 - NEVER BOTHERED
ARE THERE EXCLUSIONS OR EXCEPTIONS FOR THE QUALITY OF LIFE ASSESSMENT: NO
RATE HOW BOTHERED YOU ARE BY SYMPTOMS OF YOUR SKIN PROBLEM (EG, ITCHING, STINGING BURNING, HURTING OR SKIN IRRITATION): 0 - NEVER BOTHERED
DATE THE QUALITY-OF-LIFE ASSESSMENT WAS COMPLETED: 67212
RATE HOW EMOTIONALLY BOTHERED YOU ARE BY YOUR SKIN PROBLEM (FOR EXAMPLE, WORRY, EMBARRASSMENT, FRUSTRATION): 0 - NEVER BOTHERED

## 2025-01-07 ASSESSMENT — DERMATOLOGY PATIENT ASSESSMENT
HAVE YOU HAD OR DO YOU HAVE VASCULAR DISEASE: NO
HAVE YOU HAD OR DO YOU HAVE A STAPH INFECTION: NO
DO YOU HAVE IRREGULAR MENSTRUAL CYCLES: NO
ARE YOU ON BIRTH CONTROL: NO
DO YOU USE A TANNING BED: NO
ARE YOU TRYING TO GET PREGNANT: NO
DO YOU USE SUNSCREEN: DAILY
ARE YOU AN ORGAN TRANSPLANT RECIPIENT: NO
DO YOU HAVE ANY NEW OR CHANGING LESIONS: NO

## 2025-01-07 ASSESSMENT — ITCH NUMERIC RATING SCALE: HOW SEVERE IS YOUR ITCHING?: 0

## 2025-01-07 ASSESSMENT — PATIENT GLOBAL ASSESSMENT (PGA): PATIENT GLOBAL ASSESSMENT: PATIENT GLOBAL ASSESSMENT:  1 - CLEAR

## 2025-01-07 NOTE — PROGRESS NOTES
Subjective     amanda Degroot is a 64 y.o. female who presents for the following: Skin Check. Last derm visit 12/12/23 for Full Skin Exam.     Review of Systems:  No other skin or systemic complaints other than what is documented elsewhere in the note.    The following portions of the chart were reviewed this encounter and updated as appropriate:  Tobacco  Allergies  Meds  Problems  Med Hx  Surg Hx         Skin Cancer History  No skin cancer on file.      Specialty Problems          Dermatology Problems    Actinic keratosis    Melanocytic nevi of trunk    Other viral warts    Family history of melanoma     Melanoma: Sister.              Objective   Well appearing patient in no apparent distress; mood and affect are within normal limits.    A full examination was performed including scalp, head, eyes, ears, nose, lips, neck, chest, axillae, abdomen, back, buttocks, bilateral upper extremities, bilateral lower extremities, hands, feet, fingers, toes, fingernails, and toenails. All findings within normal limits unless otherwise noted below.    Assessment/Plan   1. Actinic keratosis  Left Zygomatic Area  Erythematous scaly macule(s)    - new macule noted on lateral left zygoma    - papule treated at last visit is completely resolved    - upcoming trip to [place], will defer treatment today. If still present at next visit, treat with liquid nitrogen     2. Screening exam for skin cancer    Full body skin exam  -No lesions concerning for malignancy on the remainder the skin exam today   - The ugly duckling sign was discussed. Monitor for any skin lesions that are different in color, shape, or size than others on body  -Sun protection was discussed. Recommend SPF 30+, hats with brims, sun protective clothing, and avoiding sun exposure between 10 AM and 2 PM whenever possible  -Recommend regular skin exams or sooner if new or changing lesions       Related Procedures  Follow Up In Dermatology - Established  Patient  Follow Up In Dermatology - Established Patient    3. Multiple benign nevi  Brown and tan macules and papules with reassuring findings on dermoscopy    -These lesions have benign, reassuring patterns on dermoscopy  -Recommend continued self observation, and to contact the office if any changes in nevi are noticed    4. Lentigo  Tan macules    -Benign appearing on exam  -Reassurance, recommend observation    5. Seborrheic keratosis  Stuck on, waxy macule(s)/papule(s)/plaque(s) with comedo-like openings and milia like cysts    -Discussed the nature of the diagnosis  -Reassurance, recommend continued observation    6. Actinic skin damage  Background of photodamage with hyper- and hypo-pigmented macules on the skin    7. Family history of skin cancer  Melanoma: Sister.         Follow up 1 year Full Skin Exam

## 2025-04-24 ENCOUNTER — APPOINTMENT (OUTPATIENT)
Facility: CLINIC | Age: 65
End: 2025-04-24
Payer: COMMERCIAL

## 2025-04-24 VITALS
HEIGHT: 67 IN | DIASTOLIC BLOOD PRESSURE: 71 MMHG | SYSTOLIC BLOOD PRESSURE: 105 MMHG | BODY MASS INDEX: 22.32 KG/M2 | WEIGHT: 142.2 LBS

## 2025-04-24 DIAGNOSIS — Z12.31 ENCOUNTER FOR SCREENING MAMMOGRAM FOR BREAST CANCER: ICD-10-CM

## 2025-04-24 DIAGNOSIS — Z01.419 NORMAL GYNECOLOGIC EXAMINATION: ICD-10-CM

## 2025-04-24 DIAGNOSIS — N76.3 CHRONIC VULVITIS: Primary | ICD-10-CM

## 2025-04-24 DIAGNOSIS — Z12.31 ENCOUNTER FOR SCREENING MAMMOGRAM FOR MALIGNANT NEOPLASM OF BREAST: ICD-10-CM

## 2025-04-24 PROCEDURE — 1036F TOBACCO NON-USER: CPT | Performed by: OBSTETRICS & GYNECOLOGY

## 2025-04-24 PROCEDURE — G0101 CA SCREEN;PELVIC/BREAST EXAM: HCPCS | Performed by: OBSTETRICS & GYNECOLOGY

## 2025-04-24 PROCEDURE — 3008F BODY MASS INDEX DOCD: CPT | Performed by: OBSTETRICS & GYNECOLOGY

## 2025-04-24 PROCEDURE — 1160F RVW MEDS BY RX/DR IN RCRD: CPT | Performed by: OBSTETRICS & GYNECOLOGY

## 2025-04-24 PROCEDURE — 1126F AMNT PAIN NOTED NONE PRSNT: CPT | Performed by: OBSTETRICS & GYNECOLOGY

## 2025-04-24 PROCEDURE — 1159F MED LIST DOCD IN RCRD: CPT | Performed by: OBSTETRICS & GYNECOLOGY

## 2025-04-24 RX ORDER — CLOBETASOL PROPIONATE 0.5 MG/G
OINTMENT TOPICAL 2 TIMES DAILY
Qty: 30 G | Refills: 2 | Status: SHIPPED | OUTPATIENT
Start: 2025-04-24 | End: 2025-05-08

## 2025-04-24 ASSESSMENT — ENCOUNTER SYMPTOMS
CONSTITUTIONAL NEGATIVE: 1
RESPIRATORY NEGATIVE: 1
EYES NEGATIVE: 1
NEUROLOGICAL NEGATIVE: 1
ENDOCRINE NEGATIVE: 1
GASTROINTESTINAL NEGATIVE: 1
CARDIOVASCULAR NEGATIVE: 1
MUSCULOSKELETAL NEGATIVE: 1
PSYCHIATRIC NEGATIVE: 1
HEMATOLOGIC/LYMPHATIC NEGATIVE: 1
ALLERGIC/IMMUNOLOGIC NEGATIVE: 1

## 2025-04-24 ASSESSMENT — PATIENT HEALTH QUESTIONNAIRE - PHQ9
1. LITTLE INTEREST OR PLEASURE IN DOING THINGS: NOT AT ALL
SUM OF ALL RESPONSES TO PHQ9 QUESTIONS 1 & 2: 0
2. FEELING DOWN, DEPRESSED OR HOPELESS: NOT AT ALL

## 2025-04-24 ASSESSMENT — PAIN SCALES - GENERAL: PAINLEVEL_OUTOF10: 0-NO PAIN

## 2025-04-24 NOTE — PROGRESS NOTES
Subjective   Patient ID: Luh Degroot is a 64 y.o. female who presents for Annual Exam (Last pap:  4/5/2024  neg/neg./Last kayece:  4/25/2024  cat 1./Last colon screen:  9/22/2023./Declines ha.   ANNA Sullivan LPN).  HPI patient is here for annual visit she complains of vulvar irritation     Review of Systems   Constitutional: Negative.    Eyes: Negative.    Respiratory: Negative.     Cardiovascular: Negative.    Gastrointestinal: Negative.    Endocrine: Negative.    Genitourinary: Negative.    Musculoskeletal: Negative.    Skin: Negative.    Allergic/Immunologic: Negative.    Neurological: Negative.    Hematological: Negative.    Psychiatric/Behavioral: Negative.         Objective   Physical Exam  Constitutional:       Appearance: Normal appearance.   HENT:      Head: Normocephalic and atraumatic.   Cardiovascular:      Rate and Rhythm: Normal rate and regular rhythm.      Pulses: Normal pulses.      Heart sounds: Normal heart sounds.   Pulmonary:      Effort: Pulmonary effort is normal.      Breath sounds: Normal breath sounds.   Abdominal:      General: Abdomen is flat. Bowel sounds are normal.      Palpations: Abdomen is soft.      Hernia: There is no hernia in the left inguinal area or right inguinal area.   Genitourinary:     General: Normal vulva.      Exam position: Lithotomy position.      Labia:         Right: Rash and tenderness present. No lesion.         Left: Rash and tenderness present. No lesion.       Urethra: No prolapse.      Vagina: Normal.      Cervix: Normal.      Uterus: Normal.       Adnexa: Right adnexa normal and left adnexa normal.      Comments: There is significant irritation in the form of chronic vulvitis involving labia minora majora and posterior perineum between vagina and rectum  Musculoskeletal:      Cervical back: Normal range of motion and neck supple.   Skin:     General: Skin is warm and dry.   Neurological:      General: No focal deficit present.      Mental Status: She is  alert and oriented to person, place, and time.         Assessment/Plan    chronic vulvitis otherwise normal gynecologic examination screening for breast cancer         Ronald Fernández MD 04/24/25 3:42 PM

## 2025-05-01 ENCOUNTER — APPOINTMENT (OUTPATIENT)
Dept: RADIOLOGY | Facility: CLINIC | Age: 65
End: 2025-05-01
Payer: COMMERCIAL

## 2025-05-01 VITALS — HEIGHT: 67 IN | WEIGHT: 142.2 LBS | BODY MASS INDEX: 22.32 KG/M2

## 2025-05-01 DIAGNOSIS — Z12.31 ENCOUNTER FOR SCREENING MAMMOGRAM FOR MALIGNANT NEOPLASM OF BREAST: ICD-10-CM

## 2025-05-01 PROCEDURE — 77067 SCR MAMMO BI INCL CAD: CPT | Performed by: RADIOLOGY

## 2025-05-01 PROCEDURE — 77067 SCR MAMMO BI INCL CAD: CPT

## 2025-05-01 PROCEDURE — 77063 BREAST TOMOSYNTHESIS BI: CPT | Performed by: RADIOLOGY

## 2025-05-09 DIAGNOSIS — Z28.39 INCOMPLETE IMMUNIZATION STATUS: Primary | ICD-10-CM

## 2025-05-11 LAB — MEV IGG SER IA-ACNC: >300 AU/ML

## 2026-01-06 ENCOUNTER — APPOINTMENT (OUTPATIENT)
Dept: OPHTHALMOLOGY | Facility: CLINIC | Age: 66
End: 2026-01-06
Payer: COMMERCIAL

## 2026-01-13 ENCOUNTER — APPOINTMENT (OUTPATIENT)
Dept: DERMATOLOGY | Facility: CLINIC | Age: 66
End: 2026-01-13
Payer: COMMERCIAL